# Patient Record
Sex: FEMALE | Race: BLACK OR AFRICAN AMERICAN | Employment: FULL TIME | URBAN - METROPOLITAN AREA
[De-identification: names, ages, dates, MRNs, and addresses within clinical notes are randomized per-mention and may not be internally consistent; named-entity substitution may affect disease eponyms.]

---

## 2017-03-23 ENCOUNTER — OFFICE VISIT (OUTPATIENT)
Dept: ORTHOPEDIC SURGERY | Age: 52
End: 2017-03-23

## 2017-03-23 VITALS
HEIGHT: 61 IN | DIASTOLIC BLOOD PRESSURE: 71 MMHG | OXYGEN SATURATION: 99 % | HEART RATE: 72 BPM | BODY MASS INDEX: 35.12 KG/M2 | SYSTOLIC BLOOD PRESSURE: 146 MMHG | WEIGHT: 186 LBS | RESPIRATION RATE: 18 BRPM

## 2017-03-23 DIAGNOSIS — M54.12 RIGHT CERVICAL RADICULOPATHY: Primary | ICD-10-CM

## 2017-03-23 DIAGNOSIS — M54.40 BACK PAIN OF LUMBAR REGION WITH SCIATICA: ICD-10-CM

## 2017-03-23 DIAGNOSIS — M48.02 CERVICAL STENOSIS OF SPINAL CANAL: ICD-10-CM

## 2017-03-23 RX ORDER — BUPRENORPHINE 5 UG/H
1 PATCH TRANSDERMAL
Qty: 4 PATCH | Refills: 1 | Status: SHIPPED | OUTPATIENT
Start: 2017-03-23 | End: 2018-09-11 | Stop reason: ALTCHOICE

## 2017-03-23 RX ORDER — ONDANSETRON 4 MG/1
4 TABLET, ORALLY DISINTEGRATING ORAL
COMMUNITY
Start: 2015-02-17 | End: 2017-03-23

## 2017-03-23 RX ORDER — GABAPENTIN 300 MG/1
CAPSULE ORAL
Qty: 90 CAP | Refills: 1 | Status: SHIPPED | OUTPATIENT
Start: 2017-03-23 | End: 2018-09-11 | Stop reason: ALTCHOICE

## 2017-03-23 NOTE — LETTER
3/23/2017 2:20 PM 
 
Ms. Italia Dior 
[address] [facility] [address] To Whom It May Concern: 
 
Italia Dior is currently under the care of Khloe N Prabhu Jamison. She is to remain out of work until her next office visit. If there are questions or concerns please have the patient contact our office. Sincerely, Porfirio Jamison MD

## 2017-03-23 NOTE — MR AVS SNAPSHOT
Visit Information Date & Time Provider Department Dept. Phone Encounter #  
 3/23/2017  1:15 PM Porfirio Jamison  [facility], [address] [facility] [phone] [id number] Follow-up Instructions Return in about 1 month (around 4/23/2017) for med f/u, adjustment. Your Appointments 4/27/2017  3:20 PM  
Follow Up with Porfirio Jamison MD  
[facility] Appt Note: 1 month nack fu $30  
 [address] [facility] [address]  
[phone]  
  
   
 [address] [facility] [address] Upcoming Health Maintenance Date Due DTaP/Tdap/Td series (1 - Tdap) 6/10/1987 PAP AKA CERVICAL CYTOLOGY 6/10/1987 BREAST CANCER SCRN MAMMOGRAM 6/10/2016 FOBT Q 1 YEAR AGE 50-75 6/10/2016 INFLUENZA AGE 9 TO ADULT 8/1/2016 Allergies as of 3/23/2017  Review Complete On: 3/23/2017 By: Porfirio Jamison MD  
  
 Severity Noted Reaction Type Reactions Pcn [Penicillins] High 01/13/2016    Anaphylaxis Codeine  01/13/2016    Nausea and Vomiting Duragesic [Fentanyl]  01/13/2016    Other (comments)  
 dizziness Current Immunizations  Never Reviewed No immunizations on file. Not reviewed this visit You Were Diagnosed With   
  
 Codes Comments Right cervical radiculopathy    -  Primary ICD-10-CM: M54.12 
ICD-9-CM: 723.4 Cervical stenosis of spinal canal     ICD-10-CM: M48.02 
ICD-9-CM: 723.0 Back pain of lumbar region with sciatica     ICD-10-CM: M54.40 ICD-9-CM: 724.2, 724.3 Vitals BP Pulse Resp Height(growth percentile) Weight(growth percentile) SpO2  
 146/71 72 18 5' 1\" (1.549 m) 186 lb (84.4 kg) 99% BMI Smoking Status 35.14 kg/m2 Never Smoker BMI and BSA Data Body Mass Index Body Surface Area  
 35.14 kg/m 2 1.91 m 2 Preferred Pharmacy Pharmacy Name Phone [id number] - [facility] [address] [place] [address] [phone] Your Updated Medication List  
  
   
This list is accurate as of: 3/23/17  2:27 PM.  Always use your most recent med list.  
  
  
  
  
 buprenorphine 5 mcg/hour patch Commonly known as:  BUTRANS  
1 Patch by TransDERmal route every seven (7) days. Max Daily Amount: 1 Patch.  
  
 gabapentin 300 mg capsule Commonly known as:  NEURONTIN  
1 tab PO QHS x 3 nights, then increase to 1 tab PO BID x 3 days, then increase to 1 tab PO TID Prescriptions Printed Refills  
 buprenorphine (BUTRANS) 5 mcg/hour patch 1 Si Patch by TransDERmal route every seven (7) days. Max Daily Amount: 1 Patch. Class: Print Route: TransDERmal  
  
Prescriptions Sent to Pharmacy Refills  
 gabapentin (NEURONTIN) 300 mg capsule 1 Si tab PO QHS x 3 nights, then increase to 1 tab PO BID x 3 days, then increase to 1 tab PO TID Class: Normal  
 Pharmacy: [id number] [address] #: [phone] Follow-up Instructions Return in about 1 month (around 2017) for med f/u, adjustment. Patient Instructions Pinched Nerve in the Neck: Care Instructions Your Care Instructions A pinched nerve in the neck happens when a vertebra or disc in the upper part of your spine is damaged. This damage can happen because of an injury. Or it can just happen with age. The changes caused by the damage may put pressure on a nearby nerve root, pinching it. This causes symptoms such as sharp pain in your neck, shoulder, arm, or back. You may also have tingling or numbness. Sometimes it makes your arm weaker. The symptoms are usually worse when you turn your head or strain your neck. For many people, the symptoms get better over time and finally go away. Early treatment usually includes medicines for pain and swelling. Sometimes physical therapy and special exercises may help. Follow-up care is a key part of your treatment and safety. Be sure to make and go to all appointments, and call your doctor if you are having problems. It's also a good idea to know your test results and keep a list of the medicines you take. How can you care for yourself at home? · Be safe with medicines. Read and follow all instructions on the label. ¨ If the doctor gave you a prescription medicine for pain, take it as prescribed. ¨ If you are not taking a prescription pain medicine, ask your doctor if you can take an over-the-counter medicine. · Try using a heating pad on a low or medium setting for 15 to 20 minutes every 2 or 3 hours. Try a warm shower in place of one session with the heating pad. You can also buy single-use heat wraps that last up to 8 hours. · You can also try an ice pack for 10 to 15 minutes every 2 to 3 hours. There isn't strong evidence that either heat or ice will help. But you can try them to see if they help you. · Don't spend too long in one position. Take short breaks to move around and change positions. · Wear a seat belt and shoulder harness when you are in a car. · Sleep with a pillow under your head and neck that keeps your neck straight. · If you were given a neck brace (cervical collar) to limit neck motion, wear it as instructed for as many days as your doctor tells you to. Do not wear it longer than you were told to. Wearing a brace for too long can lead to neck stiffness and can weaken the neck muscles. · Follow your doctor's instructions for gentle neck-stretching exercises. · Do not smoke. Smoking can slow healing of your discs. If you need help quitting, talk to your doctor about stop-smoking programs and medicines. These can increase your chances of quitting for good. · Avoid strenuous work or exercise until your doctor says it is okay. When should you call for help? Call 911 anytime you think you may need emergency care. For example, call if: 
· You are unable to move an arm or a leg at all. Call your doctor now or seek immediate medical care if: 
· You have new or worse symptoms in your arms, legs, chest, belly, or buttocks. Symptoms may include: ¨ Numbness or tingling. ¨ Weakness. ¨ Pain. · You lose bladder or bowel control. Watch closely for changes in your health, and be sure to contact your doctor if: 
· You are not getting better as expected. Where can you learn more? Go to http://gerry-louis.info/. Enter K777 in the search box to learn more about \"Pinched Nerve in the Neck: Care Instructions. \" Current as of: May 23, 2016 Content Version: 11.1 © 8005-0385 [employer]. Care instructions adapted under license by [employer] (which disclaims liability or warranty for this information). If you have questions about a medical condition or this instruction, always ask your healthcare professional. [place] [address] any warranty or liability for your use of this information. Cervical Discectomy: Before Your Surgery What is cervical discectomy? A cervical discectomy is surgery to take out damaged tissue from the discs in the neck area of the spine. You might also have bone growths (spurs) pressing on the nerves. Both of these conditions can cause pain in your back. The surgery takes pressure off the nerves. The doctor will take out tissue through a small cut in your neck. This cut is called an incision. It may be on the front of your neck, or it may be along your spine on the back of your neck. If the incision is at the front of your neck, your doctor will put in a small piece of bone between the vertebrae. Small plates and screws may be used to keep the bones in place. If the incision is at the back of your neck, the extra piece of bone often isn't needed. You will likely stay in the hospital 1 or 2 days. It may take up to 8 weeks to get back to your usual activities. Follow-up care is a key part of your treatment and safety. Be sure to make and go to all appointments, and call your doctor if you are having problems. It's also a good idea to know your test results and keep a list of the medicines you take. What happens before surgery? Surgery can be stressful. This information will help you understand what you can expect. And it will help you safely prepare for surgery. Preparing for surgery · Understand exactly what surgery is planned, along with the risks, benefits, and other options. · Tell your doctors ALL the medicines, vitamins, supplements, and herbal remedies you take. Some of these can increase the risk of bleeding or interact with anesthesia. · If you take blood thinners, such as warfarin (Coumadin), clopidogrel (Plavix), or aspirin, be sure to talk to your doctor. He or she will tell you if you should stop taking these medicines before your surgery. Make sure that you understand exactly what your doctor wants you to do. · Your doctor will tell you which medicines to take or stop before your surgery. You may need to stop taking certain medicines a week or more before surgery. So talk to your doctor as soon as you can. · If you have an advance directive, let your doctor know. It may include a living will and a durable power of  for health care. Bring a copy to the hospital. If you don't have one, you may want to prepare one. It lets your doctor and loved ones know your health care wishes. Doctors advise that everyone prepare these papers before any type of surgery or procedure. What happens on the day of surgery? · Follow the instructions exactly about when to stop eating and drinking. If you don't, your surgery may be canceled. If your doctor has told you to take your medicines on the day of surgery, take them with only a sip of water. · Take a bath or shower before you come in for your surgery. Do not apply lotions, perfumes, deodorants, or nail polish. · Do not shave the surgical site yourself. · Take off all jewelry and piercings. And take out contact lenses, if you wear them. At the hospital or surgery center · Bring a picture ID. · The area for surgery is often marked to make sure there are no errors. · You will be kept comfortable and safe by your anesthesia provider. You will be asleep during the surgery. · The surgery usually takes about 1 to 1½ hours. Going home · Be sure you have someone to drive you home. Anesthesia and pain medicine make it unsafe for you to drive. · You will be given more specific instructions about recovering from your surgery. They will cover things like diet, wound care, follow-up care, driving, and getting back to your normal routine. When should you call your doctor? · You have questions or concerns. · You don't understand how to prepare for your surgery. · You become ill before the surgery (such as fever, flu, or a cold). · You need to reschedule or have changed your mind about having the surgery. Where can you learn more? Go to http://gerry-louis.info/. Enter J462 in the search box to learn more about \"Cervical Discectomy: Before Your Surgery. \" Current as of: May 23, 2016 Content Version: 11.1 © 3513-0398 [employer]. Care instructions adapted under license by [employer] (which disclaims liability or warranty for this information). If you have questions about a medical condition or this instruction, always ask your healthcare professional. [address] any warranty or liability for your use of this information. Introducing [facility] & HEALTH SERVICES! Desirae Greenwood introduces Vaughn Burton patient portal. Now you can access parts of your medical record, email your doctor's office, and request medication refills online. 1. In your internet browser, go to [email or web address] 2. Click on the First Time User? Click Here link in the Sign In box. You will see the New Member Sign Up page. 3. Enter your [facility] Access Code exactly as it appears below. You will not need to use this code after youve completed the sign-up process. If you do not sign up before the expiration date, you must request a new code. · [facility] Access Code: [email or web address] Expires: 6/21/2017  2:27 PM 
 
4. Enter the last four digits of your Social Security Number (xxxx) and Date of Birth (mm/dd/yyyy) as indicated and click Submit. You will be taken to the next sign-up page. 5. Create a [employer] ID. This will be your [facility] login ID and cannot be changed, so think of one that is secure and easy to remember. 6. Create a [facility] password. You can change your password at any time. 7. Enter your Password Reset Question and Answer. This can be used at a later time if you forget your password. 8. Enter your e-mail address. You will receive e-mail notification when new information is available in [address] 9. Click Sign Up. You can now view and download portions of your medical record. 10. Click the Download Summary menu link to download a portable copy of your medical information. If you have questions, please visit the Frequently Asked Questions section of the [employer] [facility] website. Remember, [facility] is NOT to be used for urgent needs. For medical emergencies, dial 911. Now available from your iPhone and Android! Please provide this summary of care documentation to your next provider. Your primary care clinician is listed as PROVIDER UNKNOWN. If you have any questions after today's visit, please call [phone].

## 2017-03-23 NOTE — LETTER
3/23/2017 2:14 PM 
 
Ms. Hazel Neff 
[address] [address] [address] [address] To Whom It May Concern: 
 
Hazel Neff is currently under the care of [facility]. She is to remain out of work for the next three months. If there are questions or concerns please have the patient contact our office. Sincerely, Estrellita Brewster MD

## 2017-03-23 NOTE — PROGRESS NOTES
[facility].  [address]  [place], [address] [place] [address] Street  Phone: [phone]  Fax: [phone]        Josr Oakley  : 1965  PCP: PROVIDER UNKNOWN    PROGRESS NOTE      ASSESSMENT AND PLAN    Mary Donnelly was seen today for neck pain. Diagnoses and all orders for this visit:    Right cervical radiculopathy w/weakness  -     buprenorphine (BUTRANS) 5 mcg/hour patch; 1 Patch by TransDERmal route every seven (7) days. Max Daily Amount: 1 Patch. Cervical stenosis of spinal canal  -     buprenorphine (BUTRANS) 5 mcg/hour patch; 1 Patch by TransDERmal route every seven (7) days. Max Daily Amount: 1 Patch. Back pain of lumbar region with sciatica  -     buprenorphine (BUTRANS) 5 mcg/hour patch; 1 Patch by TransDERmal route every seven (7) days. Max Daily Amount: 1 Patch. Other orders  -     gabapentin (NEURONTIN) 300 mg capsule; 1 tab PO QHS x 3 nights, then increase to 1 tab PO BID x 3 days, then increase to 1 tab PO TID    1. Discussed indications for surgery, she has weakness. Understands possibility of incomplete healing with delayed decompression  2. Given work note to be out of work until her next office visit. 3. Continue Butrans patch. 4. Trail of Gabapentin. 5. Given surgical information for cervical discectomy. Follow-up Disposition:  Return in about 1 month (around 2017) for med f/u, adjustment. Risks and benefits of ongoing opiate therapy have been reviewed with the patient.  is not reviewed today due to technical difficulties. UDS results have been consistent. No pain behaviors. Pt has a good risk to benefit ratio which allows the pt to function in a home environment without side effects. HISTORY OF PRESENT ILLNESS  Violet Ryan is a 49 y.o. female. RHD. Pt presents to the office for a f/u visit for neck pain. UDS consistent with time of specimen.       She states that since her last office visit her pain has been increased. She reports that she picked something up at work and lost all feeling and use of her RUE in January 2017. She did not have surgery or have anything done about it. Pt states that she has good and bad days with her pain. Pt notes that she does not believe that she can do her job with her current condition. Pt notes that she did not want to come to this appointment today but she wishes to go on short term disability. Pt notes that she is able to move her RUE a bit as her and her  have been trying to increase her strength. Pt notes that for a month, she had muscle spasms in her neck that caused her to be unable to get out of bed. She was out of work for a month due to her increased pain and weakness. She notes that the pain in her RUE is a dull ache today. Continues to have weakness. Her last MRI was in 2012 and pt notes that she is still paying for it. Pt continues to have pain and paresthesia in her LLE. Her pain will interrupt her sleep at night. She has pain in her shoulder. She does not wish to consider surgery at this time. She uses Butrans patches with benefit but has run out since January/February. Pt denies any dizziness, confusion, uncontrolled constipation, and cravings due to controlled substances. She has not had great benefit from Prednisone in the past. Denies persistent fevers, chills, weight changes, neurogenic bowel or bladder symptoms. Pt denies recent ED visits or hospitalizations. Pt works at [employer] and has to lift 50 lbs boxes.      Pain Assessment  3/23/2017   Location of Pain Neck   Location Modifiers -   Severity of Pain 6   Quality of Pain Aching   Quality of Pain Comment -   Duration of Pain -   Frequency of Pain Constant   Aggravating Factors Bending;Stretching;Exercise;Standing;Walking   Aggravating Factors Comment -   Limiting Behavior -   Relieving Factors Rest   Result of Injury -           PAST MEDICAL HISTORY   Past Medical History:   Diagnosis Date    Arthritis  Diabetes ([facility] [address]     GERD (gastroesophageal reflux disease)     Migraines        Past Surgical History:   Procedure Laterality Date    HX TONSILLECTOMY      HX TUBAL LIGATION     . MEDICATIONS    Current Outpatient Prescriptions   Medication Sig Dispense Refill    buprenorphine (BUTRANS) 5 mcg/hour patch 1 Patch by TransDERmal route every seven (7) days. Max Daily Amount: 1 Patch. 4 Patch 0    ondansetron (ZOFRAN ODT) 4 mg disintegrating tablet 4 mg.  topiramate (TOPAMAX) 100 mg tablet 100 mg.      oxyCODONE-acetaminophen (PERCOCET) 5-325 mg per tablet Take 1 Tab by Mouth Every 4 Hours As Needed for Pain. Do not exceed 4000 mg of acetaminophen per day.  metFORMIN (GLUCOPHAGE) 500 mg tablet 500 mg.      meloxicam (MOBIC) 15 mg tablet Take  by Mouth Take As Needed.  fluticasone (FLONASE) 50 mcg/actuation nasal spray [place]  in Nose Every Night at Bedtime.  esomeprazole (NEXIUM) 40 mg capsule 40 mg.      ondansetron hcl (ZOFRAN, AS HYDROCHLORIDE,) 4 mg tablet Take 4 mg by mouth every eight (8) hours as needed for Nausea. ALLERGIES  Allergies   Allergen Reactions    Pcn [Penicillins] Anaphylaxis    Codeine Nausea and Vomiting    Duragesic [Fentanyl] Other (comments)     dizziness          SOCIAL HISTORY    Social History     Social History    Marital status:      Spouse name: N/A    Number of children: N/A    Years of education: N/A     Occupational History    Not on file.      Social History Main Topics    Smoking status: Never Smoker    Smokeless tobacco: Not on file    Alcohol use No    Drug use: Not on file    Sexual activity: Not on file     Other Topics Concern    Not on file     Social History Narrative       FAMILY HISTORY  Family History   Problem Relation Age of Onset   Velta Ganser Arthritis-rheumatoid Mother     Heart Disease Mother     Hypertension Mother        REVIEW OF SYSTEMS  Review of Systems   Constitutional: Negative for chills, fever and weight loss.   Respiratory: Negative for shortness of breath. Cardiovascular: Negative for chest pain. Gastrointestinal: Negative for constipation. Negative for fecal incontinence   Genitourinary: Negative for dysuria. Negative for urinary incontinence   Musculoskeletal:        Per HPI   Skin: Negative for rash. Neurological: Positive for tingling and focal weakness. Negative for dizziness, tremors and headaches. Endo/Heme/Allergies: Does not bruise/bleed easily. Psychiatric/Behavioral: The patient does not have insomnia. PHYSICAL EXAMINATION  Visit Vitals    /71    Pulse 72    Resp 18    Ht 5' 1\" (1.549 m)    Wt 186 lb (84.4 kg)    SpO2 99%    BMI 35.14 kg/m2         Accompanied by self. Constitutional:  Well developed, well nourished, in no acute distress. Psychiatric: Affect and mood are appropriate. Integumentary: No rashes or abrasions noted on exposed areas. Cardiovascular/Peripheral Vascular: Intact l pulses. No peripheral edema is noted. Lymphatic:  No evidence of lymphedema. No cervical lymphadenopathy. SPINE/MUSCULOSKELETAL EXAM    Cervical spine:  Neck is midline. Normal muscle tone. No focal atrophy is noted. Limited ROM in all planes. RT shoulder ROM actively. passively improved range. Negative Spurling's sign. Positive Tinel's sign right wrist and elbow. Negative Veloz's sign. Sensation grossly intact to light touch. Lumbar spine:  No rash, ecchymosis, or gross obliquity. No fasciculations. No focal atrophy is noted. No tenderness to palpation at the sciatic notch. SI joints non-tender. Trochanters non tender. Sensation grossly intact to light touch.         MOTOR:      Biceps  Triceps Deltoids Wrist Ext Wrist Flex Hand Intrin   Right 4/5 +4/5 +4/5 4/5 +4/5 +3/5   Left +4/5 +4/5 +4/5 +4/5 +4/5 +4/5        Hip Flex Quads Hamstrings Ankle DF EHL Ankle PF   Right +4/5 +4/5 +4/5 +4/5 +4/5 +4/5   Left +4/5 +4/5 +4/5 +4/5 +4/5 +4/5 DTRs are 1+ biceps, triceps, brachioradialis, patella, and Achilles. No difficulty with tandem gait. Ambulation without assistive device. FWB. Written by Artie Montalvo, as dictated by Ezequiel Kign MD.    Ms. Dio Verde may have a reminder for a \"due or due soon\" health maintenance. I have asked that she contact her primary care provider for follow-up on this health maintenance.

## 2017-03-23 NOTE — PATIENT INSTRUCTIONS
Pinched Nerve in the Neck: Care Instructions  Your Care Instructions  A pinched nerve in the neck happens when a vertebra or disc in the upper part of your spine is damaged. This damage can happen because of an injury. Or it can just happen with age. The changes caused by the damage may put pressure on a nearby nerve root, pinching it. This causes symptoms such as sharp pain in your neck, shoulder, arm, or back. You may also have tingling or numbness. Sometimes it makes your arm weaker. The symptoms are usually worse when you turn your head or strain your neck. For many people, the symptoms get better over time and finally go away. Early treatment usually includes medicines for pain and swelling. Sometimes physical therapy and special exercises may help. Follow-up care is a key part of your treatment and safety. Be sure to make and go to all appointments, and call your doctor if you are having problems. It's also a good idea to know your test results and keep a list of the medicines you take. How can you care for yourself at home? · Be safe with medicines. Read and follow all instructions on the label. ¨ If the doctor gave you a prescription medicine for pain, take it as prescribed. ¨ If you are not taking a prescription pain medicine, ask your doctor if you can take an over-the-counter medicine. · Try using a heating pad on a low or medium setting for 15 to 20 minutes every 2 or 3 hours. Try a warm shower in place of one session with the heating pad. You can also buy single-use heat wraps that last up to 8 hours. · You can also try an ice pack for 10 to 15 minutes every 2 to 3 hours. There isn't strong evidence that either heat or ice will help. But you can try them to see if they help you. · Don't spend too long in one position. Take short breaks to move around and change positions. · Wear a seat belt and shoulder harness when you are in a car.   · Sleep with a pillow under your head and neck that keeps your neck straight. · If you were given a neck brace (cervical collar) to limit neck motion, wear it as instructed for as many days as your doctor tells you to. Do not wear it longer than you were told to. Wearing a brace for too long can lead to neck stiffness and can weaken the neck muscles. · Follow your doctor's instructions for gentle neck-stretching exercises. · Do not smoke. Smoking can slow healing of your discs. If you need help quitting, talk to your doctor about stop-smoking programs and medicines. These can increase your chances of quitting for good. · Avoid strenuous work or exercise until your doctor says it is okay. When should you call for help? Call 911 anytime you think you may need emergency care. For example, call if:  · You are unable to move an arm or a leg at all. Call your doctor now or seek immediate medical care if:  · You have new or worse symptoms in your arms, legs, chest, belly, or buttocks. Symptoms may include:  ¨ Numbness or tingling. ¨ Weakness. ¨ Pain. · You lose bladder or bowel control. Watch closely for changes in your health, and be sure to contact your doctor if:  · You are not getting better as expected. Where can you learn more? Go to http://gerry-louis.info/. Enter J311 in the search box to learn more about \"Pinched Nerve in the Neck: Care Instructions. \"  Current as of: May 23, 2016  Content Version: 11.1  © 4512-8438 [employer]. Care instructions adapted under license by [employer] (which disclaims liability or warranty for this information). If you have questions about a medical condition or this instruction, always ask your healthcare professional. [place] [address] any warranty or liability for your use of this information. Cervical Discectomy: Before Your Surgery  What is cervical discectomy?     A cervical discectomy is surgery to take out damaged tissue from the discs in the neck area of the spine. You might also have bone growths (spurs) pressing on the nerves. Both of these conditions can cause pain in your back. The surgery takes pressure off the nerves. The doctor will take out tissue through a small cut in your neck. This cut is called an incision. It may be on the front of your neck, or it may be along your spine on the back of your neck. If the incision is at the front of your neck, your doctor will put in a small piece of bone between the vertebrae. Small plates and screws may be used to keep the bones in place. If the incision is at the back of your neck, the extra piece of bone often isn't needed. You will likely stay in the hospital 1 or 2 days. It may take up to 8 weeks to get back to your usual activities. Follow-up care is a key part of your treatment and safety. Be sure to make and go to all appointments, and call your doctor if you are having problems. It's also a good idea to know your test results and keep a list of the medicines you take. What happens before surgery? Surgery can be stressful. This information will help you understand what you can expect. And it will help you safely prepare for surgery. Preparing for surgery  · Understand exactly what surgery is planned, along with the risks, benefits, and other options. · Tell your doctors ALL the medicines, vitamins, supplements, and herbal remedies you take. Some of these can increase the risk of bleeding or interact with anesthesia. · If you take blood thinners, such as warfarin (Coumadin), clopidogrel (Plavix), or aspirin, be sure to talk to your doctor. He or she will tell you if you should stop taking these medicines before your surgery. Make sure that you understand exactly what your doctor wants you to do. · Your doctor will tell you which medicines to take or stop before your surgery. You may need to stop taking certain medicines a week or more before surgery.  So talk to your doctor as soon as you can.  · If you have an advance directive, let your doctor know. It may include a living will and a durable power of  for health care. Bring a copy to the hospital. If you don't have one, you may want to prepare one. It lets your doctor and loved ones know your health care wishes. Doctors advise that everyone prepare these papers before any type of surgery or procedure. What happens on the day of surgery? · Follow the instructions exactly about when to stop eating and drinking. If you don't, your surgery may be canceled. If your doctor has told you to take your medicines on the day of surgery, take them with only a sip of water. · Take a bath or shower before you come in for your surgery. Do not apply lotions, perfumes, deodorants, or nail polish. · Do not shave the surgical site yourself. · Take off all jewelry and piercings. And take out contact lenses, if you wear them. At the hospital or surgery center  · Bring a picture ID. · The area for surgery is often marked to make sure there are no errors. · You will be kept comfortable and safe by your anesthesia provider. You will be asleep during the surgery. · The surgery usually takes about 1 to 1½ hours. Going home  · Be sure you have someone to drive you home. Anesthesia and pain medicine make it unsafe for you to drive. · You will be given more specific instructions about recovering from your surgery. They will cover things like diet, wound care, follow-up care, driving, and getting back to your normal routine. When should you call your doctor? · You have questions or concerns. · You don't understand how to prepare for your surgery. · You become ill before the surgery (such as fever, flu, or a cold). · You need to reschedule or have changed your mind about having the surgery. Where can you learn more? Go to http://gerry-louis.info/.   Enter M376 in the search box to learn more about \"Cervical Discectomy: Before Your Surgery. \"  Current as of: May 23, 2016  Content Version: 11.1  © 5536-5222 [employer], [facility]. Care instructions adapted under license by [employer] (which disclaims liability or warranty for this information). If you have questions about a medical condition or this instruction, always ask your healthcare professional. [place] [address] any warranty or liability for your use of this information.

## 2018-09-11 ENCOUNTER — DOCUMENTATION ONLY (OUTPATIENT)
Dept: ORTHOPEDIC SURGERY | Age: 53
End: 2018-09-11

## 2018-09-11 ENCOUNTER — OFFICE VISIT (OUTPATIENT)
Dept: ORTHOPEDIC SURGERY | Age: 53
End: 2018-09-11

## 2018-09-11 VITALS
BODY MASS INDEX: 36.93 KG/M2 | DIASTOLIC BLOOD PRESSURE: 68 MMHG | WEIGHT: 195.6 LBS | TEMPERATURE: 98.1 F | OXYGEN SATURATION: 98 % | SYSTOLIC BLOOD PRESSURE: 160 MMHG | RESPIRATION RATE: 19 BRPM | HEIGHT: 61 IN | HEART RATE: 88 BPM

## 2018-09-11 DIAGNOSIS — M48.02 CERVICAL STENOSIS OF SPINAL CANAL: Primary | ICD-10-CM

## 2018-09-11 DIAGNOSIS — M54.12 CERVICAL RADICULOPATHY: ICD-10-CM

## 2018-09-11 RX ORDER — KETOROLAC TROMETHAMINE 15 MG/ML
60 INJECTION, SOLUTION INTRAMUSCULAR; INTRAVENOUS ONCE
Qty: 1 VIAL | Refills: 0
Start: 2018-09-11 | End: 2018-09-11

## 2018-09-11 RX ORDER — MELOXICAM 15 MG/1
15 TABLET ORAL DAILY
Qty: 30 TAB | Refills: 2 | Status: SHIPPED | OUTPATIENT
Start: 2018-09-11 | End: 2022-02-24 | Stop reason: SDUPTHER

## 2018-09-11 RX ORDER — AMITRIPTYLINE HYDROCHLORIDE 10 MG/1
10 TABLET, FILM COATED ORAL
Qty: 30 TAB | Refills: 1 | Status: SHIPPED | OUTPATIENT
Start: 2018-09-11 | End: 2022-01-06

## 2018-09-11 RX ORDER — AMITRIPTYLINE HYDROCHLORIDE 25 MG/1
25 TABLET, FILM COATED ORAL
Qty: 30 TAB | Refills: 2 | Status: SHIPPED | OUTPATIENT
Start: 2018-09-11 | End: 2018-09-11 | Stop reason: SDUPTHER

## 2018-09-11 NOTE — MR AVS SNAPSHOT
[address] [facility] [address] [facility] [address] [facility] [address] 
[phone] Patient: Dora Saleem MRN: [id number] :1965 Visit Information Date & Time Provider Department Dept. Phone Encounter #  
 2018  8:50 AM London Tang NP [facility] [phone] [id number] Follow-up Instructions Return in about 4 weeks (around 10/9/2018) for butteyr . Upcoming Health Maintenance Date Due DTaP/Tdap/Td series (1 - Tdap) 6/10/1987 PAP AKA CERVICAL CYTOLOGY 6/10/1987 BREAST CANCER SCRN MAMMOGRAM 6/10/2016 FOBT Q 1 YEAR AGE 50-75 6/10/2016 Influenza Age 5 to Adult 2018 Allergies as of 2018  Review Complete On: 2018 By: Jena Munoz Severity Noted Reaction Type Reactions Pcn [Penicillins] High 2016    Anaphylaxis Codeine  2016    Nausea and Vomiting Duragesic [Fentanyl]  2016    Other (comments)  
 dizziness Current Immunizations  Never Reviewed No immunizations on file. Not reviewed this visit You Were Diagnosed With   
  
 Codes Comments Cervical stenosis of spinal canal    -  Primary ICD-10-CM: M48.02 
ICD-9-CM: 723.0 Cervical radiculopathy     ICD-10-CM: M54.12 
ICD-9-CM: 723.4 Vitals BP Pulse Temp Resp Height(growth percentile) Weight(growth percentile) 160/68 88 98.1 °F (36.7 °C) 19 5' 1\" (1.549 m) 195 lb 9.6 oz (88.7 kg) SpO2 BMI Smoking Status 98% 36.96 kg/m2 Never Smoker BMI and BSA Data Body Mass Index Body Surface Area  
 36.96 kg/m 2 1.95 m 2 Preferred Pharmacy Pharmacy Name Phone [facility] [address] [phone] Your Updated Medication List  
  
   
This list is accurate as of 18  9:29 AM.  Always use your most recent med list.  
  
  
  
  
 amitriptyline 25 mg tablet Commonly known as:  ELAVIL  
 Take 1 Tab by mouth nightly.  
  
 ketorolac 15 mg/mL Soln injection Commonly known as:  TORADOL  
4 mL by IntraMUSCular route once for 1 dose. meloxicam 15 mg tablet Commonly known as:  MOBIC Take 1 Tab by mouth daily. Prescriptions Printed Refills  
 amitriptyline (ELAVIL) 25 mg tablet 2 Sig: Take 1 Tab by mouth nightly. Class: Print Route: Oral  
  
Prescriptions Sent to Pharmacy Refills  
 meloxicam (MOBIC) 15 mg tablet 2 Sig: Take 1 Tab by mouth daily. Class: Normal  
 [facility]acy: [id number] - [address], [address] [place] [address] #: [phone] Route: Oral  
  
We Performed the Following KETOROLAC TROMETHAMINE INJ [ [facility]] MD THER/PROPH/DIAG INJECTION, SUBCUT/IM [id number] CPT(R)] Follow-up Instructions Return in about 4 weeks (around 10/9/2018) for butteyr . Patient Instructions Neck Arthritis: Exercises Your Care Instructions Here are some examples of typical rehabilitation exercises for your condition. Start each exercise slowly. Ease off the exercise if you start to have pain. Your doctor or physical therapist will tell you when you can start these exercises and which ones will work best for you. How to do the exercises Neck stretches to the side 1. This stretch works best if you keep your shoulder down as you lean away from it. To help you remember to do this, start by relaxing your shoulders and lightly holding on to your thighs or your chair. 2. Tilt your head toward your shoulder and hold for 15 to 30 seconds. Let the weight of your head stretch your muscles. 3. Repeat 2 to 4 times toward each shoulder. Chin tuck 1. Lie on the floor with a rolled-up towel under your neck. Your head should be touching the floor. 2. Slowly bring your chin toward your chest. 
3. Hold for a count of 6, and then relax for up to 10 seconds. 4. Repeat 8 to 12 times. Active cervical rotation 1. Sit in a firm chair, or stand up straight. 2. Keeping your chin level, turn your head to the right, and hold for 15 to 30 seconds. 3. Turn your head to the left and hold for 15 to 30 seconds. 4. Repeat 2 to 4 times to each side. Shoulder blade squeeze 1. While standing, squeeze your shoulder blades together. 2. Do not raise your shoulders up as you are squeezing. 3. Hold for 6 seconds. 4. Repeat 8 to 12 times. Shoulder rolls 1. Sit comfortably with your feet shoulder-width apart. You can also do this exercise standing up. 2. Roll your shoulders up, then back, and then down in a smooth, circular motion. 3. Repeat 2 to 4 times. Follow-up care is a key part of your treatment and safety. Be sure to make and go to all appointments, and call your doctor if you are having problems. It's also a good idea to know your test results and keep a list of the medicines you take. Where can you learn more? Go to http://gerry-louis.info/. Enter L446 in the search box to learn more about \"Neck Arthritis: Exercises. \" Current as of: November 29, 2017 Content Version: 11.7 © 8492-7323 [employer]. Care instructions adapted under license by [employer] (which disclaims liability or warranty for this information). If you have questions about a medical condition or this instruction, always ask your healthcare professional. [place] [address] any warranty or liability for your use of this information. Neck Spasm: Exercises Your Care Instructions Here are some examples of typical rehabilitation exercises for your condition. Start each exercise slowly. Ease off the exercise if you start to have pain. Your doctor or physical therapist will tell you when you can start these exercises and which ones will work best for you. How to do the exercises Levator scapula stretch 4. Sit in a firm chair, or stand up straight. 5. Gently tilt your head toward your left shoulder. 6. Turn your head to look down into your armpit, bending your head slightly forward. Let the weight of your head stretch your neck muscles. 7. Hold for 15 to 30 seconds. 8. Return to your starting position. 9. Follow the same instructions above, but tilt your head toward your right shoulder. 10. Repeat 2 to 4 times toward each shoulder. Upper trapezius stretch 5. Sit in a firm chair, or stand up straight. 6. This stretch works best if you keep your shoulder down as you lean away from it. To help you remember to do this, start by relaxing your shoulders and lightly holding on to your thighs or your chair. 7. Tilt your head toward your shoulder and hold for 15 to 30 seconds. Let the weight of your head stretch your muscles. 8. If you would like a little added stretch, place your arm behind your back. Use the arm opposite of the direction you are tilting your head. For example, if you are tilting your head to the left, place your right arm behind your back. 9. Repeat 2 to 4 times toward each shoulder. Neck rotation 5. Sit in a firm chair, or stand up straight. 6. Keeping your chin level, turn your head to the right, and hold for 15 to 30 seconds. 7. Turn your head to the left, and hold for 15 to 30 seconds. 8. Repeat 2 to 4 times to each side. Chin tuck 5. Lie on the floor with a rolled-up towel under your neck. Your head should be touching the floor. 6. Slowly bring your chin toward the front of your neck. 7. Hold for a count of 6, and then relax for up to 10 seconds. 8. Repeat 8 to 12 times. Forward neck flexion 4. Sit in a firm chair, or stand up straight. 5. Bend your head forward. 6. Hold for 15 to 30 seconds, then return to your starting position. 7. Repeat 2 to 4 times. Follow-up care is a key part of your treatment and safety.  Be sure to make and go to all appointments, and call your doctor if you are having problems. It's also a good idea to know your test results and keep a list of the medicines you take. Where can you learn more? Go to http://gerry-louis.info/. Enter P962 in the search box to learn more about \"Neck Spasm: Exercises. \" Current as of: November 29, 2017 Content Version: 11.7 © 0269-8563 [employer]. Care instructions adapted under license by [employer] (which disclaims liability or warranty for this information). If you have questions about a medical condition or this instruction, always ask your healthcare professional. [place] [address] any warranty or liability for your use of this information. Cervical Spinal Stenosis: Care Instructions Your Care Instructions Spinal stenosis is a narrowing of the canal that surrounds the spinal cord and nerve roots. Sometimes bone and other tissue grow into this canal and press on the nerves that branch out from the spinal cord. This can happen as a part of aging. When the narrowing happens in your neck, it's called cervical spinal stenosis. It often causes stiffness, pain, numbness, and weakness in the neck, shoulders, arms, hands, or legs. It can even cause problems with your balance, coordination, and bowel or bladder control. But some people have no symptoms. You may be able to get relief from the symptoms of spinal stenosis by taking pain medicine. Your doctor may suggest physical therapy and exercises to keep your spine strong and flexible. Some people try steroid shots to reduce swelling. If pain and numbness in your neck, arms, or legs are still so bad that you cannot do your normal activities, you may need surgery. Follow-up care is a key part of your treatment and safety. Be sure to make and go to all appointments, and call your doctor if you are having problems. It's also a good idea to know your test results and keep a list of the medicines you take. How can you care for yourself at home? · Ask your doctor if you can take an over-the-counter pain medicine, such as acetaminophen (Tylenol), ibuprofen (Advil, Motrin), or naproxen (Aleve). Be safe with medicines. Read and follow all instructions on the label. · Do not take two or more pain medicines at the same time unless the doctor told you to. Many pain medicines have acetaminophen, which is Tylenol. Too much acetaminophen (Tylenol) can be harmful. · Change positions often when you are standing or sitting. This may reduce pressure on the spinal cord and its nerves. · When you rest, use pillows or towel rolls to support your neck and head in a comfortable position. · Follow your doctor's instructions about activity. He or she may tell you not to do sports or activities that could injure your neck. · Stretch your neck and shoulders as your doctor or physical therapist recommends. If your doctor says it is okay to do them, these exercises may help: 
¨ Neck stretches to the side. Keep your shoulders relaxed and slowly tilt your head straight over toward one shoulder. Hold for 15 seconds. Let the weight of your head stretch your muscles. Then do the same toward the other shoulder. ¨ Neck rotations. Keep your chin level and slowly turn your head to one side. Hold for 15 seconds. Then do the same to the other side. ¨ Shoulder rolls. Roll your shoulders up, then back, and then down in a smooth, circular motion. Repeat several times. When should you call for help? Call 911 anytime you think you may need emergency care. For example, call if: 
  · You are unable to move an arm or a leg at all.  
[facility] your doctor now or seek immediate medical care if: 
  · You have new or worse symptoms in your arms, legs, belly, or buttocks. Symptoms may include: ¨ Numbness or tingling. ¨ Weakness.  
¨ Pain.  
  · You lose bladder or bowel control.  
 Watch closely for changes in your health, and be sure to contact your doctor if: 
  · You do not get better as expected. Where can you learn more? Go to http://gerry-louis.info/. Enter  in the search box to learn more about \"Cervical Spinal Stenosis: Care Instructions. \" Current as of: November 29, 2017 Content Version: 11.7 © 7777-0897 [employer]. Care instructions adapted under license by [employer] (which disclaims liability or warranty for this information). If you have questions about a medical condition or this instruction, always ask your healthcare professional. [address] any warranty or liability for your use of this information. Introducing [facility]! [employer] introduces [employer] patient portal. Now you can access parts of your medical record, email your doctor's office, and request medication refills online. 1. In your internet browser, go to [email or web address] 2. Click on the First Time User? Click Here link in the Sign In box. You will see the New Member Sign Up page. 3. Enter your [employer] Access Code exactly as it appears below. You will not need to use this code after youve completed the sign-up process. If you do not sign up before the expiration date, you must request a new code. · [employer] Access Code: [email or web address] Expires: 12/10/2018  9:29 AM 
 
4. Enter the last four digits of your Social Security Number (xxxx) and Date of Birth (mm/dd/yyyy) as indicated and click Submit. You will be taken to the next sign-up page. 5. Create a [employer] ID. This will be your [employer] login ID and cannot be changed, so think of one that is secure and easy to remember. 6. Create a [employer] password. You can change your password at any time. 7. Enter your Password Reset Question and Answer. This can be used at a later time if you forget your password. 8. Enter your e-mail address.  You will receive e-mail notification when new information is available in [employer]. 9. Click Sign Up. You can now view and download portions of your medical record. 10. Click the Download Summary menu link to download a portable copy of your medical information. If you have questions, please visit the Frequently Asked Questions section of the [employer] website. Remember, [employer] is NOT to be used for urgent needs. For medical emergencies, dial 911. Now available from your iPhone and Android! Please provide this summary of care documentation to your next provider. Your primary care clinician is listed as Nicole Mccallum. If you have any questions after today's visit, please call [phone].

## 2018-09-11 NOTE — PROGRESS NOTES
[address]  [address], [address]  [place]  Phone: [phone]  Fax: [phone]    Manohar March  : 1965  PCP: KULDIP Urbano    PROGRESS NOTE    HISTORY OF PRESENT ILLNESS:  Chief Complaint   Patient presents with    Neck Pain    Arm Pain     Bilateral     Follow-up     Aga Newman is a 47 y.o.  female with history of neck pain. Patient was last seen with Dr. Crystal Barr in 2017. Per that note, Pt notes that she does not believe that she can do her job with her current condition. Pt notes that she did not want to come to the appointment but she wishes to go on short term disability. She was having pain in her shoulder and LLE. She did not want to consider surgery. She was given butrans patches and gabapentin. She has had prednisone in the past. Last MRI in . Today, she is back today with right arm pain. She is starting to have some effect on the left arm as well. She states her right thumb is numb and she her forearm is numb. If she turns her neck a certain way she will get a \"asleep\" feeling in her arm and it will be sharp at times. She states she is off balance at times and does admit to dropping objects. This has flared over the past month. She states normally, it will calm down but this time it has not. She has taken Elavil and Mobic in the past with benefit. She does not recall taking the Gabapentin that was prescribed over a year ago. Denies bladder/bowel dysfunction, saddle paresthesia, weakness, gait disturbance, or other neurological deficit. Pt at this time desires to continue with current care/proceed with medication evaluation. Pt works at [employer] and has to lift 50 lbs boxes. ASSESSMENT  47 y.o. female with cervical pain. Diagnoses and all orders for this visit:    1.  Cervical stenosis of spinal canal  -     KETOROLAC TROMETHAMINE INJ  -     ketorolac (TORADOL) 15 mg/mL soln injection; 4 mL by IntraMUSCular route once for 1 dose. -     THER/PROPH/DIAG INJECTION, SUBCUT/IM  -     amitriptyline (ELAVIL) 25 mg tablet; Take 1 Tab by mouth nightly. -     meloxicam (MOBIC) 15 mg tablet; Take 1 Tab by mouth daily. 2. Cervical radiculopathy  -     KETOROLAC TROMETHAMINE INJ  -     ketorolac (TORADOL) 15 mg/mL soln injection; 4 mL by IntraMUSCular route once for 1 dose. -     THER/PROPH/DIAG INJECTION, SUBCUT/IM  -     amitriptyline (ELAVIL) 25 mg tablet; Take 1 Tab by mouth nightly. -     meloxicam (MOBIC) 15 mg tablet; Take 1 Tab by mouth daily. IMPRESSION/PLAN    1) Pt was given information on cervical exercises. 2) Toradol 60 mg today. 3) Restart Elavil, this has helped in the past. Restart Mobic. Discussed this can place you at a risk for cardiovascular effects and GI. She will take PRN. 4) discussed MRI in detail. She is still paying off the 2012 MRI and would like to hold off ,however ,we did discuss her symptoms and that this is a recommended test. Will try HEP and elavil first.   5) Ms. Obdulia Jimenes has a reminder for a \"due or due soon\" health maintenance. I have asked that she contact her primary care provider, KULDIP Urbano, for follow-up on this health maintenance. 6) We have informed patient to notify us for immediate appointment if he has any worsening neurogical symptoms or if an emergency situation presents, then call 911  7) Pt will follow-up in 4-6 weeks. Risks and benefits of ongoing opiate therapy have been reviewed with the patient.  is appropriate. PAST MEDICAL HISTORY  Past Medical History:   Diagnosis Date    Arthritis     Diabetes ([facility] [address]     GERD (gastroesophageal reflux disease)     Migraines         MEDICATIONS  Current Outpatient Prescriptions   Medication Sig Dispense Refill    ketorolac (TORADOL) 15 mg/mL soln injection 4 mL by IntraMUSCular route once for 1 dose. 1 Vial 0    amitriptyline (ELAVIL) 25 mg tablet Take 1 Tab by mouth nightly.  30 Tab 2    meloxicam (MOBIC) 15 mg tablet Take 1 Tab by mouth daily. 30 Tab 2       ALLERGIES  Allergies   Allergen Reactions    Pcn [Penicillins] Anaphylaxis    Codeine Nausea and Vomiting    Duragesic [Fentanyl] Other (comments)     dizziness       SOCIAL HISTORY    Social History     Social History    Marital status:      Spouse name: N/A    Number of children: N/A    Years of education: N/A     Occupational History    Not on file. Social History Main Topics    Smoking status: Never Smoker    Smokeless tobacco: Never Used    Alcohol use No    Drug use: Not on file    Sexual activity: Not on file     Other Topics Concern    Not on file     Social History Narrative       SUBJECTIVE      Pain Scale: 8/10    Pain Assessment  3/23/2017   Location of Pain Neck   Location Modifiers -   Severity of Pain 6   Quality of Pain Aching   Quality of Pain Comment -   Duration of Pain -   Frequency of Pain Constant   Aggravating Factors Bending;Stretching;Exercise;Standing;Walking   Aggravating Factors Comment -   Limiting Behavior -   Relieving Factors Rest   Result of Injury -       Accompanied by self. REVIEW OF SYSTEMS  ROS    Constitutional: Negative for fever, chills, or weight change. Respiratory: Negative for cough or shortness of breath. Cardiovascular: Negative for chest pain or palpitations. Gastrointestinal: Negative for acid reflux, change in bowel habits, or constipation. Genitourinary: Negative for incontinence, dysuria and flank pain. Musculoskeletal: Positive for cervical pain. Skin: Negative for rash. Neurological: Negative for headaches, dizziness, or numbness. Endo/Heme/Allergies: Negative . Psychiatric/Behavioral: Negative.        PHYSICAL EXAMINATION  Visit Vitals    /68    Pulse 88    Temp 98.1 °F (36.7 °C)    Resp 19    Ht 5' 1\" (1.549 m)    Wt 195 lb 9.6 oz (88.7 kg)    SpO2 98%    BMI 36.96 kg/m2       Constitutional: Well developed,  well nourished,  awake, alert, and in no acute distress. Neurological:  Sensation to light touch is intact. Psychiatric: Affect and mood are appropriate. Integumentary: No rashes or abrasions noted on exposed areas,  warm, dry and intact. Cardiovascular/Peripheral Vascular:  No peripheral edema is noted. Lymphatic:  No evidence of lymphedema. No cervical lymphadenopathy. SPINE/MUSCULOSKELETAL EXAM    Cervical spine:  Neck is midline. Normal muscle tone. No focal atrophy is noted. Shoulder ROM intact. NTenderness to palpation to cervical pain. Negative Spurling's sign. Negative Tinel's sign. Negative Veloz's sign. MOTOR    Biceps  Triceps Deltoids Wrist Ext Wrist Flex Hand Intrin   Right -4/5 -4/5 -4/5 +4/5 +4/5 -4/5   Left +4/5 +4/5 +4/5 +4/5 +4/5 +4/5       normal gait and station, poor tandem. Ambulation without assistive device. full weight bearing, non-antalgic gait.     No Shape, NP

## 2018-09-11 NOTE — PROGRESS NOTES
-called patient and advised her to use a pill cutter and take half of the 25 mg elavil that she picked up. The patient stated that she already took a pill when she got home. She was advised to cut the pill in half if she felt any adverse affects and take half a pill for a week, then increase to a whole pill. If she does not feel any adverse affects, then she can continue with taking a whole pill QHS. The patient verbalized understanding.

## 2018-09-11 NOTE — PATIENT INSTRUCTIONS
Neck Arthritis: Exercises  Your Care Instructions  Here are some examples of typical rehabilitation exercises for your condition. Start each exercise slowly. Ease off the exercise if you start to have pain. Your doctor or physical therapist will tell you when you can start these exercises and which ones will work best for you. How to do the exercises  Neck stretches to the side    1. This stretch works best if you keep your shoulder down as you lean away from it. To help you remember to do this, start by relaxing your shoulders and lightly holding on to your thighs or your chair. 2. Tilt your head toward your shoulder and hold for 15 to 30 seconds. Let the weight of your head stretch your muscles. 3. Repeat 2 to 4 times toward each shoulder. Chin tuck    1. Lie on the floor with a rolled-up towel under your neck. Your head should be touching the floor. 2. Slowly bring your chin toward your chest.  3. Hold for a count of 6, and then relax for up to 10 seconds. 4. Repeat 8 to 12 times. Active cervical rotation    1. Sit in a firm chair, or stand up straight. 2. Keeping your chin level, turn your head to the right, and hold for 15 to 30 seconds. 3. Turn your head to the left and hold for 15 to 30 seconds. 4. Repeat 2 to 4 times to each side. Shoulder blade squeeze    1. While standing, squeeze your shoulder blades together. 2. Do not raise your shoulders up as you are squeezing. 3. Hold for 6 seconds. 4. Repeat 8 to 12 times. Shoulder rolls    1. Sit comfortably with your feet shoulder-width apart. You can also do this exercise standing up. 2. Roll your shoulders up, then back, and then down in a smooth, circular motion. 3. Repeat 2 to 4 times. Follow-up care is a key part of your treatment and safety. Be sure to make and go to all appointments, and call your doctor if you are having problems. It's also a good idea to know your test results and keep a list of the medicines you take.   Where can you learn more? Go to http://gerry-louis.info/. Enter C826 in the search box to learn more about \"Neck Arthritis: Exercises. \"  Current as of: November 29, 2017  Content Version: 11.7  © 5917-8163 [employer]. Care instructions adapted under license by [employer] (which disclaims liability or warranty for this information). If you have questions about a medical condition or this instruction, always ask your healthcare professional. [address] any warranty or liability for your use of this information. Neck Spasm: Exercises  Your Care Instructions  Here are some examples of typical rehabilitation exercises for your condition. Start each exercise slowly. Ease off the exercise if you start to have pain. Your doctor or physical therapist will tell you when you can start these exercises and which ones will work best for you. How to do the exercises  Levator scapula stretch    4. Sit in a firm chair, or stand up straight. 5. Gently tilt your head toward your left shoulder. 6. Turn your head to look down into your armpit, bending your head slightly forward. Let the weight of your head stretch your neck muscles. 7. Hold for 15 to 30 seconds. 8. Return to your starting position. 9. Follow the same instructions above, but tilt your head toward your right shoulder. 10. Repeat 2 to 4 times toward each shoulder. Upper trapezius stretch    5. Sit in a firm chair, or stand up straight. 6. This stretch works best if you keep your shoulder down as you lean away from it. To help you remember to do this, start by relaxing your shoulders and lightly holding on to your thighs or your chair. 7. Tilt your head toward your shoulder and hold for 15 to 30 seconds. Let the weight of your head stretch your muscles. 8. If you would like a little added stretch, place your arm behind your back. Use the arm opposite of the direction you are tilting your head. For example, if you are tilting your head to the left, place your right arm behind your back. 9. Repeat 2 to 4 times toward each shoulder. Neck rotation    5. Sit in a firm chair, or stand up straight. 6. Keeping your chin level, turn your head to the right, and hold for 15 to 30 seconds. 7. Turn your head to the left, and hold for 15 to 30 seconds. 8. Repeat 2 to 4 times to each side. Chin tuck    5. Lie on the floor with a rolled-up towel under your neck. Your head should be touching the floor. 6. Slowly bring your chin toward the front of your neck. 7. Hold for a count of 6, and then relax for up to 10 seconds. 8. Repeat 8 to 12 times. Forward neck flexion    4. Sit in a firm chair, or stand up straight. 5. Bend your head forward. 6. Hold for 15 to 30 seconds, then return to your starting position. 7. Repeat 2 to 4 times. Follow-up care is a key part of your treatment and safety. Be sure to make and go to all appointments, and call your doctor if you are having problems. It's also a good idea to know your test results and keep a list of the medicines you take. Where can you learn more? Go to http://gerry-louis.info/. Enter P962 in the search box to learn more about \"Neck Spasm: Exercises. \"  Current as of: November 29, 2017  Content Version: 11.7  © 4821-1543 [employer]. Care instructions adapted under license by [employer] (which disclaims liability or warranty for this information). If you have questions about a medical condition or this instruction, always ask your healthcare professional. [place] [address] any warranty or liability for your use of this information. Cervical Spinal Stenosis: Care Instructions  Your Care Instructions    Spinal stenosis is a narrowing of the canal that surrounds the spinal cord and nerve roots.  Sometimes bone and other tissue grow into this canal and press on the nerves that branch out from the spinal cord. This can happen as a part of aging. When the narrowing happens in your neck, it's called cervical spinal stenosis. It often causes stiffness, pain, numbness, and weakness in the neck, shoulders, arms, hands, or legs. It can even cause problems with your balance, coordination, and bowel or bladder control. But some people have no symptoms. You may be able to get relief from the symptoms of spinal stenosis by taking pain medicine. Your doctor may suggest physical therapy and exercises to keep your spine strong and flexible. Some people try steroid shots to reduce swelling. If pain and numbness in your neck, arms, or legs are still so bad that you cannot do your normal activities, you may need surgery. Follow-up care is a key part of your treatment and safety. Be sure to make and go to all appointments, and call your doctor if you are having problems. It's also a good idea to know your test results and keep a list of the medicines you take. How can you care for yourself at home? · Ask your doctor if you can take an over-the-counter pain medicine, such as acetaminophen (Tylenol), ibuprofen (Advil, Motrin), or naproxen (Aleve). Be safe with medicines. Read and follow all instructions on the label. · Do not take two or more pain medicines at the same time unless the doctor told you to. Many pain medicines have acetaminophen, which is Tylenol. Too much acetaminophen (Tylenol) can be harmful. · Change positions often when you are standing or sitting. This may reduce pressure on the spinal cord and its nerves. · When you rest, use pillows or towel rolls to support your neck and head in a comfortable position. · Follow your doctor's instructions about activity. He or she may tell you not to do sports or activities that could injure your neck. · Stretch your neck and shoulders as your doctor or physical therapist recommends.  If your doctor says it is okay to do them, these exercises may help:  ¨ Neck stretches to the side. Keep your shoulders relaxed and slowly tilt your head straight over toward one shoulder. Hold for 15 seconds. Let the weight of your head stretch your muscles. Then do the same toward the other shoulder. ¨ Neck rotations. Keep your chin level and slowly turn your head to one side. Hold for 15 seconds. Then do the same to the other side. ¨ Shoulder rolls. Roll your shoulders up, then back, and then down in a smooth, circular motion. Repeat several times. When should you call for help? Call 911 anytime you think you may need emergency care. For example, call if:    · You are unable to move an arm or a leg at all.   [facility] your doctor now or seek immediate medical care if:    · You have new or worse symptoms in your arms, legs, belly, or buttocks. Symptoms may include:  ¨ Numbness or tingling. ¨ Weakness. ¨ Pain.     · You lose bladder or bowel control.    Watch closely for changes in your health, and be sure to contact your doctor if:    · You do not get better as expected. Where can you learn more? Go to http://gerry-louis.info/. Enter  in the search box to learn more about \"Cervical Spinal Stenosis: Care Instructions. \"  Current as of: November 29, 2017  Content Version: 11.7  © 5589-5877 [employer]. Care instructions adapted under license by [employer] (which disclaims liability or warranty for this information). If you have questions about a medical condition or this instruction, always ask your healthcare professional. [place] [address] any warranty or liability for your use of this information.

## 2018-09-11 NOTE — PROGRESS NOTES
Please call pharmacy. I would like to the patient to have the 10 mg dose filled, not the 25 mg dose.

## 2021-11-02 ENCOUNTER — APPOINTMENT (OUTPATIENT)
Dept: ULTRASOUND IMAGING | Age: 56
End: 2021-11-02
Attending: NURSE PRACTITIONER
Payer: COMMERCIAL

## 2021-11-02 ENCOUNTER — HOSPITAL ENCOUNTER (EMERGENCY)
Age: 56
Discharge: HOME | End: 2021-11-02
Attending: EMERGENCY MEDICINE
Payer: COMMERCIAL

## 2021-11-02 VITALS
SYSTOLIC BLOOD PRESSURE: 151 MMHG | BODY MASS INDEX: 34.78 KG/M2 | RESPIRATION RATE: 18 BRPM | DIASTOLIC BLOOD PRESSURE: 79 MMHG | HEIGHT: 62 IN | OXYGEN SATURATION: 100 % | WEIGHT: 189 LBS | HEART RATE: 81 BPM | TEMPERATURE: 99 F

## 2021-11-02 DIAGNOSIS — R11.2 NON-INTRACTABLE VOMITING WITH NAUSEA, UNSPECIFIED VOMITING TYPE: ICD-10-CM

## 2021-11-02 DIAGNOSIS — E11.65 HYPERGLYCEMIA DUE TO DIABETES MELLITUS (HCC): ICD-10-CM

## 2021-11-02 DIAGNOSIS — K80.20 CALCULUS OF GALLBLADDER WITHOUT CHOLECYSTITIS WITHOUT OBSTRUCTION: Primary | ICD-10-CM

## 2021-11-02 LAB
ALBUMIN SERPL-MCNC: 3.3 G/DL (ref 3.4–5)
ALBUMIN/GLOB SERPL: 0.8 {RATIO} (ref 0.8–1.7)
ALP SERPL-CCNC: 115 U/L (ref 45–117)
ALT SERPL-CCNC: 15 U/L (ref 13–56)
ANION GAP SERPL CALC-SCNC: 10 MMOL/L (ref 3–18)
APPEARANCE UR: CLEAR
AST SERPL-CCNC: 7 U/L (ref 10–38)
BASOPHILS # BLD: 0 K/UL (ref 0–0.1)
BASOPHILS NFR BLD: 0 % (ref 0–2)
BILIRUB SERPL-MCNC: 0.4 MG/DL (ref 0.2–1)
BILIRUB UR QL: NEGATIVE
BUN SERPL-MCNC: 5 MG/DL (ref 7–18)
BUN/CREAT SERPL: 8 (ref 12–20)
CALCIUM SERPL-MCNC: 8.9 MG/DL (ref 8.5–10.1)
CHLORIDE SERPL-SCNC: 102 MMOL/L (ref 100–111)
CO2 SERPL-SCNC: 26 MMOL/L (ref 21–32)
COLOR UR: YELLOW
CREAT SERPL-MCNC: 0.62 MG/DL (ref 0.6–1.3)
DIFFERENTIAL METHOD BLD: NORMAL
EOSINOPHIL # BLD: 0 K/UL (ref 0–0.4)
EOSINOPHIL NFR BLD: 1 % (ref 0–5)
ERYTHROCYTE [DISTWIDTH] IN BLOOD BY AUTOMATED COUNT: 12.2 % (ref 11.6–14.5)
EST. AVERAGE GLUCOSE BLD GHB EST-MCNC: 301 MG/DL
GLOBULIN SER CALC-MCNC: 4.1 G/DL (ref 2–4)
GLUCOSE BLD STRIP.AUTO-MCNC: 220 MG/DL (ref 70–110)
GLUCOSE SERPL-MCNC: 348 MG/DL (ref 74–99)
GLUCOSE UR STRIP.AUTO-MCNC: >1000 MG/DL
HBA1C MFR BLD: 12.1 % (ref 4.2–5.6)
HCT VFR BLD AUTO: 39.6 % (ref 35–45)
HGB BLD-MCNC: 13.4 G/DL (ref 12–16)
HGB UR QL STRIP: NEGATIVE
KETONES UR QL STRIP.AUTO: ABNORMAL MG/DL
LEUKOCYTE ESTERASE UR QL STRIP.AUTO: NEGATIVE
LIPASE SERPL-CCNC: 240 U/L (ref 73–393)
LYMPHOCYTES # BLD: 1.9 K/UL (ref 0.9–3.6)
LYMPHOCYTES NFR BLD: 34 % (ref 21–52)
MAGNESIUM SERPL-MCNC: 1.7 MG/DL (ref 1.6–2.6)
MCH RBC QN AUTO: 27.3 PG (ref 24–34)
MCHC RBC AUTO-ENTMCNC: 33.8 G/DL (ref 31–37)
MCV RBC AUTO: 80.8 FL (ref 78–100)
MONOCYTES # BLD: 0.5 K/UL (ref 0.05–1.2)
MONOCYTES NFR BLD: 8 % (ref 3–10)
NEUTS SEG # BLD: 3.1 K/UL (ref 1.8–8)
NEUTS SEG NFR BLD: 56 % (ref 40–73)
NITRITE UR QL STRIP.AUTO: NEGATIVE
PH UR STRIP: 6.5 [PH] (ref 5–8)
PLATELET # BLD AUTO: 261 K/UL (ref 135–420)
PMV BLD AUTO: 10.3 FL (ref 9.2–11.8)
POTASSIUM SERPL-SCNC: 3.2 MMOL/L (ref 3.5–5.5)
PROT SERPL-MCNC: 7.4 G/DL (ref 6.4–8.2)
PROT UR STRIP-MCNC: NEGATIVE MG/DL
RBC # BLD AUTO: 4.9 M/UL (ref 4.2–5.3)
SODIUM SERPL-SCNC: 138 MMOL/L (ref 136–145)
SP GR UR REFRACTOMETRY: >1.03 (ref 1–1.03)
UROBILINOGEN UR QL STRIP.AUTO: 1 EU/DL (ref 0.2–1)
WBC # BLD AUTO: 5.5 K/UL (ref 4.6–13.2)

## 2021-11-02 PROCEDURE — 74011250636 HC RX REV CODE- 250/636: Performed by: NURSE PRACTITIONER

## 2021-11-02 PROCEDURE — 83735 ASSAY OF MAGNESIUM: CPT

## 2021-11-02 PROCEDURE — 82962 GLUCOSE BLOOD TEST: CPT

## 2021-11-02 PROCEDURE — 96375 TX/PRO/DX INJ NEW DRUG ADDON: CPT

## 2021-11-02 PROCEDURE — 99283 EMERGENCY DEPT VISIT LOW MDM: CPT

## 2021-11-02 PROCEDURE — 96361 HYDRATE IV INFUSION ADD-ON: CPT

## 2021-11-02 PROCEDURE — 74011000250 HC RX REV CODE- 250: Performed by: NURSE PRACTITIONER

## 2021-11-02 PROCEDURE — 74011250637 HC RX REV CODE- 250/637: Performed by: NURSE PRACTITIONER

## 2021-11-02 PROCEDURE — 76705 ECHO EXAM OF ABDOMEN: CPT

## 2021-11-02 PROCEDURE — 85025 COMPLETE CBC W/AUTO DIFF WBC: CPT

## 2021-11-02 PROCEDURE — 80053 COMPREHEN METABOLIC PANEL: CPT

## 2021-11-02 PROCEDURE — 96374 THER/PROPH/DIAG INJ IV PUSH: CPT

## 2021-11-02 PROCEDURE — 83690 ASSAY OF LIPASE: CPT

## 2021-11-02 PROCEDURE — 81003 URINALYSIS AUTO W/O SCOPE: CPT

## 2021-11-02 PROCEDURE — 83036 HEMOGLOBIN GLYCOSYLATED A1C: CPT

## 2021-11-02 RX ORDER — POTASSIUM CHLORIDE 20 MEQ/1
40 TABLET, EXTENDED RELEASE ORAL
Status: COMPLETED | OUTPATIENT
Start: 2021-11-02 | End: 2021-11-02

## 2021-11-02 RX ORDER — LANCETS
EACH MISCELLANEOUS
Qty: 10 EACH | Refills: 0 | Status: SHIPPED | OUTPATIENT
Start: 2021-11-02

## 2021-11-02 RX ORDER — BLOOD-GLUCOSE METER
1 EACH MISCELLANEOUS EVERY MORNING
Qty: 1 EACH | Refills: 0 | Status: SHIPPED | OUTPATIENT
Start: 2021-11-02

## 2021-11-02 RX ORDER — METFORMIN HYDROCHLORIDE 500 MG/1
500 TABLET ORAL 2 TIMES DAILY WITH MEALS
Qty: 30 TABLET | Refills: 0 | Status: SHIPPED | OUTPATIENT
Start: 2021-11-02

## 2021-11-02 RX ORDER — FAMOTIDINE 20 MG/1
20 TABLET, FILM COATED ORAL 2 TIMES DAILY
Qty: 20 TABLET | Refills: 0 | Status: SHIPPED | OUTPATIENT
Start: 2021-11-02 | End: 2021-11-12

## 2021-11-02 RX ORDER — ONDANSETRON 2 MG/ML
4 INJECTION INTRAMUSCULAR; INTRAVENOUS
Status: COMPLETED | OUTPATIENT
Start: 2021-11-02 | End: 2021-11-02

## 2021-11-02 RX ORDER — ONDANSETRON 4 MG/1
4 TABLET, ORALLY DISINTEGRATING ORAL
Qty: 12 TABLET | Refills: 0 | Status: SHIPPED
Start: 2021-11-02 | End: 2022-02-24

## 2021-11-02 RX ORDER — BLOOD SUGAR DIAGNOSTIC
STRIP MISCELLANEOUS
Qty: 100 STRIP | Refills: 0 | Status: SHIPPED | OUTPATIENT
Start: 2021-11-02

## 2021-11-02 RX ORDER — ONDANSETRON 4 MG/1
4 TABLET, ORALLY DISINTEGRATING ORAL
Qty: 12 TABLET | Refills: 0 | Status: SHIPPED | OUTPATIENT
Start: 2021-11-02 | End: 2021-11-02 | Stop reason: SDUPTHER

## 2021-11-02 RX ADMIN — ONDANSETRON 4 MG: 2 INJECTION INTRAMUSCULAR; INTRAVENOUS at 17:39

## 2021-11-02 RX ADMIN — POTASSIUM CHLORIDE 40 MEQ: 1500 TABLET, EXTENDED RELEASE ORAL at 18:30

## 2021-11-02 RX ADMIN — FAMOTIDINE 20 MG: 10 INJECTION INTRAVENOUS at 19:17

## 2021-11-02 RX ADMIN — SODIUM CHLORIDE 1000 ML: 900 INJECTION, SOLUTION INTRAVENOUS at 17:39

## 2021-11-02 NOTE — ED PROVIDER NOTES
EMERGENCY DEPARTMENT HISTORY AND PHYSICAL EXAM    Date: 11/2/2021  Patient Name: Nicole Oropeza    History of Presenting Illness     Chief Complaint   Patient presents with    Abdominal Pain     ruq \"feels like glass moving through my stomach\" h/o gallstones; nausea         History Provided By: Patient      Additional History (Context): Nicole Oropeza is a 55-year-old female who presents to the ER with past medical history significant for diabetes (previously on Metformin about 7 years ago but discontinued for unknown reasons), gallstones, arthritis, migraines, and GERD who presents with complaints of right upper quadrant abdominal pain that started 2 weeks ago accompanied by nausea and vomiting. She states the vomiting was present for the first 2 days and was bilious and nonbloody but resolved on its own. She has had decreased appetite and nausea ever since. She denies any fever, chills, urinary complaints, diarrhea, or constipation. She has not been taking any medications over-the-counter. No exposure to ill contacts. PCP: KULDIP Martin    Current Outpatient Medications   Medication Sig Dispense Refill    famotidine (Pepcid) 20 mg tablet Take 1 Tablet by mouth two (2) times a day for 10 days. 20 Tablet 0    metFORMIN (GLUCOPHAGE) 500 mg tablet Take 1 Tablet by mouth two (2) times daily (with meals). 30 Tablet 0    Blood-Glucose Meter (OneTouch Verio Flex meter) misc 1 Each by Does Not Apply route Every morning. 1 Each 0    glucose blood VI test strips (OneTouch Verio test strips) strip Use with each glucose check daily before breakfast 100 Strip 0    lancets (Microlet Lancet) misc Use with each glucose check daily before breakfast 10 Each 0    ondansetron (Zofran ODT) 4 mg disintegrating tablet Take 1 Tablet by mouth every eight (8) hours as needed for Nausea or Vomiting for up to 12 doses. 12 Tablet 0    meloxicam (MOBIC) 15 mg tablet Take 1 Tab by mouth daily.  30 Tab 2    amitriptyline (ELAVIL) 10 mg tablet Take 1 Tab by mouth nightly. 30 Tab 1       Past History     Past Medical History:  Past Medical History:   Diagnosis Date    Arthritis     Diabetes ([address]     GERD (gastroesophageal reflux disease)     Migraines        Past Surgical History:  Past Surgical History:   Procedure Laterality Date    HX TONSILLECTOMY      HX TUBAL LIGATION         Family History:  Family History   Problem Relation Age of Onset   Jacob Heath Arthritis-rheumatoid Mother     Heart Disease Mother     Hypertension Mother        Social History:  Social History     Tobacco Use    Smoking status: Never Smoker    Smokeless tobacco: Never Used   Vaping Use    Vaping Use: Never used   Substance Use Topics    Alcohol use: No    Drug use: Never       Allergies: Allergies   Allergen Reactions    Pcn [Penicillins] Anaphylaxis    Codeine Nausea and Vomiting    Duragesic [Fentanyl] Other (comments)     dizziness         Review of Systems     Review of Systems   Constitutional: Negative for chills and fever. HENT: Negative for nasal congestion, sore throat, rhinorrhea  Eyes: Negative. Respiratory: negative  cough and negative for shortness of breath. Cardiovascular: Negative for chest pain and palpitations. Gastrointestinal: Positive for abdominal pain, nausea and vomiting. Negative for constipation or diarrhea. Genitourinary: Negative for difficulty urinating, hematuria, and flank pain. Musculoskeletal: Negative for back pain. Negative for gait problem and neck pain. Skin: Negative for rash. Allergic/Immunologic: Negative. Neurological: Negative for dizziness, weakness, numbness and headaches. Psychiatric/Behavioral: Negative. All other systems reviewed and are negative.   All Other Systems Negative  Physical Exam     Vitals:    11/02/21 1614   BP: (!) 151/79   Pulse: 81   Resp: 18   Temp: 99 °F (37.2 °C)   SpO2: 100%   Weight: 85.7 kg (189 lb)   Height: 5' 2\" (1.575 m)     Physical Exam  Vitals and nursing note reviewed. Constitutional:       General: She is not in acute distress. Appearance: Normal appearance. She is not ill-appearing, toxic-appearing or diaphoretic. HENT:      Head: Normocephalic and atraumatic. Nose: Nose normal.      Mouth/Throat:      Mouth: Mucous membranes are moist.      Pharynx: Oropharynx is clear. Eyes:      General: Lids are normal. Vision grossly intact. No scleral icterus. Conjunctiva/sclera: Conjunctivae normal.   Cardiovascular:      Rate and Rhythm: Normal rate and regular rhythm. Pulses: Normal pulses. Heart sounds: Normal heart sounds. No murmur heard. Pulmonary:      Effort: Pulmonary effort is normal. No respiratory distress. Breath sounds: Normal breath sounds. No stridor. No wheezing, rhonchi or rales. Chest:      Chest wall: No tenderness. Abdominal:      General: There is no distension. Palpations: Abdomen is soft. There is no mass. Tenderness: There is abdominal tenderness in the right upper quadrant and epigastric area. There is guarding (Minimal). There is no right CVA tenderness or left CVA tenderness. Hernia: No hernia is present. Musculoskeletal:         General: Normal range of motion. Cervical back: Full passive range of motion without pain, normal range of motion and neck supple. No tenderness. Lymphadenopathy:      Cervical: No cervical adenopathy. Skin:     General: Skin is warm and dry. Capillary Refill: Capillary refill takes less than 2 seconds. Neurological:      General: No focal deficit present. Mental Status: She is alert and oriented to person, place, and time. Psychiatric:         Mood and Affect: Mood normal.         Behavior: Behavior normal. Behavior is cooperative.            Diagnostic Study Results     Labs -     Recent Results (from the past 12 hour(s))   CBC WITH AUTOMATED DIFF    Collection Time: 11/02/21  4:30 PM   Result Value Ref Range    WBC 5.5 4.6 - 13.2 K/uL RBC 4.90 4.20 - 5.30 M/uL    HGB 13.4 12.0 - 16.0 g/dL    HCT 39.6 35.0 - 45.0 %    MCV 80.8 78.0 - 100.0 FL    MCH 27.3 24.0 - 34.0 PG    MCHC 33.8 31.0 - 37.0 g/dL    RDW 12.2 11.6 - 14.5 %    PLATELET 812 816 - 415 K/uL    MPV 10.3 9.2 - 11.8 FL    NEUTROPHILS 56 40 - 73 %    LYMPHOCYTES 34 21 - 52 %    MONOCYTES 8 3 - 10 %    EOSINOPHILS 1 0 - 5 %    BASOPHILS 0 0 - 2 %    ABS. NEUTROPHILS 3.1 1.8 - 8.0 K/UL    ABS. LYMPHOCYTES 1.9 0.9 - 3.6 K/UL    ABS. MONOCYTES 0.5 0.05 - 1.2 K/UL    ABS. EOSINOPHILS 0.0 0.0 - 0.4 K/UL    ABS. BASOPHILS 0.0 0.0 - 0.1 K/UL    DF AUTOMATED     METABOLIC PANEL, COMPREHENSIVE    Collection Time: 11/02/21  4:30 PM   Result Value Ref Range    Sodium 138 136 - 145 mmol/L    Potassium 3.2 (L) 3.5 - 5.5 mmol/L    Chloride 102 100 - 111 mmol/L    CO2 26 21 - 32 mmol/L    Anion gap 10 3.0 - 18 mmol/L    Glucose 348 (H) 74 - 99 mg/dL    BUN 5 (L) 7.0 - 18 MG/DL    Creatinine 0.62 0.6 - 1.3 MG/DL    BUN/Creatinine ratio 8 (L) 12 - 20      GFR est AA >60 >60 ml/min/1.73m2    GFR est non-AA >60 >60 ml/min/1.73m2    Calcium 8.9 8.5 - 10.1 MG/DL    Bilirubin, total 0.4 0.2 - 1.0 MG/DL    ALT (SGPT) 15 13 - 56 U/L    AST (SGOT) 7 (L) 10 - 38 U/L    Alk.  phosphatase 115 45 - 117 U/L    Protein, total 7.4 6.4 - 8.2 g/dL    Albumin 3.3 (L) 3.4 - 5.0 g/dL    Globulin 4.1 (H) 2.0 - 4.0 g/dL    A-G Ratio 0.8 0.8 - 1.7     LIPASE    Collection Time: 11/02/21  4:30 PM   Result Value Ref Range    Lipase 240 73 - 393 U/L   MAGNESIUM    Collection Time: 11/02/21  4:30 PM   Result Value Ref Range    Magnesium 1.7 1.6 - 2.6 mg/dL   URINALYSIS W/ RFLX MICROSCOPIC    Collection Time: 11/02/21  6:47 PM   Result Value Ref Range    Color YELLOW      Appearance CLEAR      Specific gravity >1.030 (H) 1.005 - 1.030    pH (UA) 6.5 5.0 - 8.0      Protein Negative NEG mg/dL    Glucose >1,000 (A) NEG mg/dL    Ketone TRACE (A) NEG mg/dL    Bilirubin Negative NEG      Blood Negative NEG      Urobilinogen 1.0 0.2 - 1.0 EU/dL    Nitrites Negative NEG      Leukocyte Esterase Negative NEG     GLUCOSE, POC    Collection Time: 11/02/21  7:24 PM   Result Value Ref Range    Glucose (POC) 220 (H) 70 - 110 mg/dL       Radiologic Studies -   [facility]   Final Result      1. Gallbladder is filled with gallstones, but no other findings to suggest   cholecystitis. This favors biliary colic. 2. Likely profound hepatic steatosis. CT Results  (Last 48 hours)    None        CXR Results  (Last 48 hours)    None            Medical Decision Making   I am the first provider for this patient. I reviewed the vital signs, available nursing notes, past medical history, past surgical history, family history and social history. Vital Signs-Reviewed the patient's vital signs. Records Reviewed: Nursing notes, old medical records and any previous labs, imaging, visits, consultations pertinent to patient care    Procedures:  Procedures      ED Course: Progress Notes, Reevaluation, and Consults:  5:04 PM  Initial assessment performed. The patients presenting problems have been discussed, and they/their family are in agreement with the care plan formulated and outlined with them. I have encouraged them to ask questions as they arise throughout their visit. 5:15 PM CBC shows no leukocytosis, anemia, shift, or bands. CMP shows a potassium of 3.2 and a glucose of 348. Oral potassium ordered. Normal anion gap and CO2 and no physical evidence of DKA on exam.  Liver enzymes are within normal limits and lipase is normal.  Urinalysis shows trace ketones and glucose in the urine consistent with hyperglycemia. Patient states she was previously on Metformin which she took about 7 years ago and after taking for 1 to 2 months she was discontinued off this medication is unsure why. She never checked her blood sugar regularly is unsure what her last A1c was. Nausea improved after Zofran.     6:30 PM ultrasound of the gallbladder shows many gallstones with normal gallbladder wall thickness and normal CBD. Negative Valles sign. No evidence of acute cholecystitis. Also hepatic steatosis. After IV fluids, Pepcid, and Zofran patient is feeling much better. Abdomen is soft nontender nausea improved. She is tolerating oral intake well. I spent a considerable amount of time discussing her results including hyperglycemia and need to start medications. Her recheck of blood glucose was 220. She is feeling much better. She prefers to be treated outpatient. We will give her a referral for general surgery for the gallbladder as well as information on gallbladder disease and low-fat diet. Will refer her to PCP and endocrinology and start her on low-dose Metformin and give her a glucometer and have her check her sugar daily before breakfast and keep a log. We also added on an A1c which is pending. Patient has been much better and is happy with this plan. She appears well-hydrated vitals are stable and she remains afebrile. Patient has never used a glucometer, however, her  who is at the bedside is a diabetic and checks his blood sugar regularly. He states he can help her check her blood sugar with a new glucometer which I ordered. ER return precautions discussed at length. Provider Notes (Medical Decision Making):   Patient presents ambulatory in no acute distress, well-hydrated, non-toxic in appearance, with slight elevation in blood pressure but otherwise normal vitals. Benign exam of abdomen with tenderness in the right upper quadrant and epigastrium with negative Valles sign and no peritoneal signs. Will obtain appropriate studies to evaluate patient's complaints and treat symptomatically. Will disposition after reassessment assuming no clinical change or worsening and appropriate response to symptomatic treatment. MED RECONCILIATION:  No current facility-administered medications for this encounter.      Current Outpatient Medications   Medication Sig    famotidine (Pepcid) 20 mg tablet Take 1 Tablet by mouth two (2) times a day for 10 days.  metFORMIN (GLUCOPHAGE) 500 mg tablet Take 1 Tablet by mouth two (2) times daily (with meals).  Blood-Glucose Meter (OneTouch Verio Flex meter) misc 1 Each by Does Not Apply route Every morning.  glucose blood VI test strips (OneTouch Verio test strips) strip Use with each glucose check daily before breakfast    lancets (Microlet Lancet) misc Use with each glucose check daily before breakfast    ondansetron (Zofran ODT) 4 mg disintegrating tablet Take 1 Tablet by mouth every eight (8) hours as needed for Nausea or Vomiting for up to 12 doses.  meloxicam (MOBIC) 15 mg tablet Take 1 Tab by mouth daily.  amitriptyline (ELAVIL) 10 mg tablet Take 1 Tab by mouth nightly. Disposition:  Home in stable condition. DISCHARGE NOTE:     Patient has been reexamined. Patient has no new complaints, changes, or physical findings. Patient demonstrates understanding of current diagnoses and is in agreement with the treatment plan. They are advised that while the likelihood of serious underlying condition is low at this point given the evaluation performed today, we cannot fully rule it out. They are advised to immediately return with any new symptoms or worsening of current condition. Care plan outlined and precautions discussed. Discussed proper way to take medications. Medication use, risk/benefit, side effects and precautions discussed in detail. Discussed treatment plan, return precautions, symptomatic relief, and expected time to improvement. All questions answered. Patient is stable for discharge and outpatient management. Patient is ready to go home.     Follow-up Information     Follow up With Specialties Details Why Contact Info    Marissa [facility], Physician Assistant Schedule an appointment as soon as possible for a visit  Follow-up from the Emergency [address] [facility] [address]  [phone]      [facility] ENDOCRINE & METABLOIC DISORDERS  Schedule an appointment as soon as possible for a visit  Follow-up from the Emergency Department for diabetes [address] [facility]    Roger Ren MD General Surgery Schedule an appointment as soon as possible for a visit  Follow-up from the Emergency Department for gallstones [address]  [address]  [phone]            Current Discharge Medication List      START taking these medications    Details   famotidine (Pepcid) 20 mg tablet Take 1 Tablet by mouth two (2) times a day for 10 days. Qty: 20 Tablet, Refills: 0  Start date: 11/2/2021, End date: 11/12/2021      metFORMIN (GLUCOPHAGE) 500 mg tablet Take 1 Tablet by mouth two (2) times daily (with meals). Qty: 30 Tablet, Refills: 0  Start date: 11/2/2021      Blood-Glucose Meter (OneTouch Verio Flex meter) misc 1 Each by Does Not Apply route Every morning. Qty: 1 Each, Refills: 0  Start date: 11/2/2021      glucose blood VI test strips (OneTouch Verio test strips) strip Use with each glucose check daily before breakfast  Qty: 100 Strip, Refills: 0  Start date: 11/2/2021      lancets (Microlet Lancet) misc Use with each glucose check daily before breakfast  Qty: 10 Each, Refills: 0  Start date: 11/2/2021      ondansetron (Zofran ODT) 4 mg disintegrating tablet Take 1 Tablet by mouth every eight (8) hours as needed for Nausea or Vomiting for up to 12 doses. Qty: 12 Tablet, Refills: 0  Start date: 11/2/2021                   Diagnosis     Clinical Impression:   1. Calculus of gallbladder without cholecystitis without obstruction    2. Hyperglycemia due to diabetes mellitus ([address]    3. Non-intractable vomiting with nausea, unspecified vomiting type        Dictation disclaimer:  Please note that this dictation was completed with [employer], the [employer] voice recognition software.   Quite often unanticipated grammatical, syntax, homophones, and other interpretive errors are inadvertently transcribed by the computer software. Please disregard these errors. Please excuse any errors that have escaped final proofreading.

## 2021-11-02 NOTE — ED NOTES
Pt presents with c/o RUQ pain x 2 weeks accompanied by nausea. She reports pain is worse after eating and \"feels like glass going through my stomach\".

## 2022-01-06 ENCOUNTER — OFFICE VISIT (OUTPATIENT)
Dept: ORTHOPEDIC SURGERY | Age: 57
End: 2022-01-06
Payer: COMMERCIAL

## 2022-01-06 VITALS
DIASTOLIC BLOOD PRESSURE: 80 MMHG | OXYGEN SATURATION: 98 % | TEMPERATURE: 98.6 F | BODY MASS INDEX: 31.65 KG/M2 | SYSTOLIC BLOOD PRESSURE: 140 MMHG | WEIGHT: 172 LBS | HEART RATE: 86 BPM | HEIGHT: 62 IN

## 2022-01-06 DIAGNOSIS — G57.11 MERALGIA PARESTHETICA, RIGHT: ICD-10-CM

## 2022-01-06 DIAGNOSIS — M48.02 CERVICAL STENOSIS OF SPINAL CANAL: Primary | ICD-10-CM

## 2022-01-06 DIAGNOSIS — M47.816 LUMBAR SPONDYLOSIS: ICD-10-CM

## 2022-01-06 PROCEDURE — 99204 OFFICE O/P NEW MOD 45 MIN: CPT | Performed by: PHYSICAL MEDICINE & REHABILITATION

## 2022-01-06 PROCEDURE — 96372 THER/PROPH/DIAG INJ SC/IM: CPT | Performed by: PHYSICAL MEDICINE & REHABILITATION

## 2022-01-06 RX ORDER — GABAPENTIN 300 MG/1
CAPSULE ORAL
Qty: 90 CAPSULE | Refills: 1 | Status: SHIPPED | OUTPATIENT
Start: 2022-01-06 | End: 2022-02-04 | Stop reason: SDUPTHER

## 2022-01-06 RX ORDER — NEOMYCIN SULFATE, POLYMYXIN B SULFATE, HYDROCORTISONE 3.5; 10000; 1 MG/ML; [USP'U]/ML; MG/ML
SOLUTION/ DROPS AURICULAR (OTIC)
COMMUNITY
Start: 2021-02-28 | End: 2022-01-06

## 2022-01-06 RX ORDER — KETOROLAC TROMETHAMINE 30 MG/ML
30 INJECTION, SOLUTION INTRAMUSCULAR; INTRAVENOUS
Status: COMPLETED | OUTPATIENT
Start: 2022-01-06 | End: 2022-01-06

## 2022-01-06 RX ORDER — MONTELUKAST SODIUM 10 MG/1
10 TABLET ORAL
COMMUNITY
Start: 2021-03-12

## 2022-01-06 RX ORDER — AZELASTINE 1 MG/ML
2 SPRAY, METERED NASAL
COMMUNITY
Start: 2021-03-12

## 2022-01-06 RX ADMIN — KETOROLAC TROMETHAMINE 30 MG: 30 INJECTION, SOLUTION INTRAMUSCULAR; INTRAVENOUS at 14:18

## 2022-01-06 NOTE — LETTER
1/8/2022    Patient: Lisbet Abbott   YOB: 1965   Date of Visit: 1/6/2022     Dru Dan  [address] [place] [address] [address] [facility] [address]  Via Fax: [phone]    Dear KULDIP Dan,      Thank you for referring Ms. Lisbet Abbott to [address] [place] [address] [facility] for evaluation. My notes for this consultation are attached. If you have questions, please do not hesitate to call me. I look forward to following your patient along with you.       Sincerely,    Brittani Maya MD

## 2022-01-06 NOTE — PROGRESS NOTES
Preeti Rodriguez presents today for   Chief Complaint   Patient presents with    Back Pain       Is someone accompanying this pt? no    Is the patient using any DME equipment during OV? no    Depression Screening:  No flowsheet data found. Learning Assessment:  No flowsheet data found. Abuse Screening:  No flowsheet data found. Fall Risk  No flowsheet data found. OPIOID RISK TOOL  No flowsheet data found. Coordination of Care:  1. Have you been to the ER, urgent care clinic since your last visit? Yes November 2021 gallbladder   Hospitalized since your last visit?no    2. Have you seen or consulted any other health care providers outside of the [address] [place] [address] since your last visit? no Include any pap smears or colon screening.  no

## 2022-01-06 NOTE — PROGRESS NOTES
[facility], [address]  [place], [address] [place] [address] Street  Phone: [phone]  Fax: [phone]        Christiano Thacker  : 1965  PCP: KULDIP Pratt    NEW PATIENT EVALUATION      ASSESSMENT AND PLAN    Diagnoses and all orders for this visit:    1. Cervical stenosis of spinal canal  -     gabapentin (NEURONTIN) 300 mg capsule; Month #1: One po qhs x 1 week, then one po bid x1 week, then one po tid thereafter  -     ketorolac (TORADOL) injection 30 mg  -     MRI CERV SPINE WO CONT; Future    2. Meralgia paresthetica, right  -     gabapentin (NEURONTIN) 300 mg capsule; Month #1: One po qhs x 1 week, then one po bid x1 week, then one po tid thereafter  -     ketorolac (TORADOL) injection 30 mg    3. Lumbar spondylosis  -     ketorolac (TORADOL) injection 30 mg         1. Marissa Olguin is a 55 y.o. female with previously known cervical stenosis presenting with symptoms of cervical myelopathy. .  I feel that her meralgia paresthetica is related to her recent diagnosis of uncontrolled diabetes. 2. Trial of Gabapentin 300 mg TID by ramp  3. Toradol 30 mg IM x 1 now   4. Cervical MRI for 3 months increasing pain, weakness B/L arms, numbness, known stenosis   5. No lifting, avoid overhead activities. TORADOL INJECTION:  Administrations This Visit     ketorolac (TORADOL) injection 30 mg     Admin Date  2022  14:18 Action  Given Dose  30 mg Route  IntraMUSCular Site  Right Gluteus Rex Administered By  Krystal Nassar LPN    NDC: 42941-009-56    Patient Supplied?: No                HISTORY OF PRESENT ILLNESS  Marissa Olguin is seen today in consultation for neck and low back pain x 3 months. Neck = low back. Last seen , discussed surgery for cervical radiculopathy. She reports that her pain is progressively worsening. She notes burning and sensitivity in her right anterolateral thigh. She has numbness and tingling in her arms and legs.  She also mentions issues with her balance. Patient states that she has insomnia at night due to pain. Last A1C 14. Recent diagnosis of diabetes. She is miserable and at the point of surgery. Pain Assessment  1/6/2022   Location of Pain Back   Location Modifiers -   Severity of Pain 7   Quality of Pain Other (Comment)   Quality of Pain Comment all of it   Duration of Pain Persistent   Frequency of Pain Constant   Aggravating Factors Other (Comment)   Aggravating Factors Comment any and everything   Limiting Behavior No   Relieving Factors Heat;Nothing   Result of Injury No       Onset of pain: Chronic, worse since 11/2021    Does pain radiate into extremities: right anterolateral thigh, bilateral upper extremities    Denies persistent fevers, chills, weight changes, saddle paresthesias, and neurogenic bowel or bladder symptoms. Affirms constipation    Investigations:   C MRI 2012: mild stenosis C4-5  L MRI 2012: disc osteophyte complex L5-S1  Spine surgery consult: unknown    Treatments:  Physical therapy: yes  Spinal injections: yes  Spinal surgery- no  Beneficial medications: Elavil, Mobic  Failed medications: none    Work Status:   Pertinent PMHx:  DM, GERD, migrianes GFR normal 11/2021.      Visit Vitals  BP (!) 140/80 (BP 1 Location: Right upper arm, BP Patient Position: Sitting, BP Cuff Size: Adult) Comment: pt asymptomatic, MD aware   Pulse 86   Temp 98.6 °F (37 °C) (Temporal)   Ht 5' 2\" (1.575 m)   Wt 172 lb (78 kg)   LMP 12/22/2021   SpO2 98%   BMI 31.46 kg/m²       PHYSICAL EXAM    Decreased IR and ER due to shoulder pain  abduction left shoulder 90 degrees , right 120 degrees  Tender biceps tendon  4/5 weakness intrinsic and pinch   positive Tinel's right wrist and elbow  TTP L4-5  Forward flexed  SLR negative  Negative Veloz's  DTRs 1+ UE, 2+ patella  Moderate difficulty with tandem gait      Past Medical History:   Diagnosis Date    Arthritis     Diabetes ([address]     GERD (gastroesophageal reflux disease)     Migraines         Past Surgical History:   Procedure Laterality Date    HX TONSILLECTOMY      HX TUBAL LIGATION           Current Outpatient Medications   Medication Sig Dispense Refill    azelastine (ASTELIN) 137 mcg (0.1 %) nasal spray 1 puff in each nostril      montelukast (Singulair) 10 mg tablet 1 tablet      gabapentin (NEURONTIN) 300 mg capsule Month #1: One po qhs x 1 week, then one po bid x1 week, then one po tid thereafter 90 Capsule 1    metFORMIN (GLUCOPHAGE) 500 mg tablet Take 1 Tablet by mouth two (2) times daily (with meals). 30 Tablet 0    Blood-Glucose Meter (OneTouch Verio Flex meter) misc 1 Each by Does Not Apply route Every morning. 1 Each 0    glucose blood VI test strips (OneTouch Verio test strips) strip Use with each glucose check daily before breakfast 100 Strip 0    lancets (Microlet Lancet) misc Use with each glucose check daily before breakfast 10 Each 0    ondansetron (Zofran ODT) 4 mg disintegrating tablet Take 1 Tablet by mouth every eight (8) hours as needed for Nausea or Vomiting for up to 12 doses. 12 Tablet 0    meloxicam (MOBIC) 15 mg tablet Take 1 Tab by mouth daily.  30 Tab 2

## 2022-01-06 NOTE — PROGRESS NOTES
Consent was explained to the pt and signed. No questions or concerns voiced at this time. Pt given 30mg/1ml of toradol IM in right gluteus. No sign or symptoms of infection noted at injection site. There was no bleeding, swelling or leaking noted after injection. Pt handed injection information sheet to take home. Ms. Jaylon Vera tolerated the injection well and did not want to wait in the exam room for observation. She ambulated to check out without any issues.

## 2022-01-15 DIAGNOSIS — M48.02 CERVICAL STENOSIS OF SPINAL CANAL: ICD-10-CM

## 2022-01-18 ENCOUNTER — HOSPITAL ENCOUNTER (OUTPATIENT)
Age: 57
Discharge: HOME | End: 2022-01-18
Attending: PHYSICAL MEDICINE & REHABILITATION
Payer: COMMERCIAL

## 2022-01-18 PROCEDURE — 72141 MRI NECK SPINE W/O DYE: CPT

## 2022-02-04 DIAGNOSIS — M48.02 CERVICAL STENOSIS OF SPINAL CANAL: ICD-10-CM

## 2022-02-04 DIAGNOSIS — G57.11 MERALGIA PARESTHETICA, RIGHT: ICD-10-CM

## 2022-02-06 RX ORDER — GABAPENTIN 300 MG/1
300 CAPSULE ORAL 3 TIMES DAILY
Qty: 90 CAPSULE | Refills: 1 | Status: SHIPPED | OUTPATIENT
Start: 2022-02-06 | End: 2022-02-09 | Stop reason: SDUPTHER

## 2022-02-09 RX ORDER — GABAPENTIN 300 MG/1
300 CAPSULE ORAL 3 TIMES DAILY
Qty: 270 CAPSULE | Refills: 0 | Status: SHIPPED
Start: 2022-02-09 | End: 2022-02-24 | Stop reason: SINTOL

## 2022-02-09 NOTE — TELEPHONE ENCOUNTER
Ulices Jaramillo is requesting a 90 day supply  Previous Rx was sent to [facility]     Pending Prescriptions Disp Refills    gabapentin (NEURONTIN) 300 mg capsule 270 Capsule 0     Sig: Take 1 Capsule by mouth three (3) times daily. Max Daily Amount: 900 mg. Signed Prescriptions Disp Refills    gabapentin (NEURONTIN) 300 mg capsule 90 Capsule 1     Sig: Take 1 Capsule by mouth three (3) times daily. Max Daily Amount: 900 mg.      Authorizing Provider: Cielo Morrison

## 2022-02-24 ENCOUNTER — OFFICE VISIT (OUTPATIENT)
Dept: ORTHOPEDIC SURGERY | Age: 57
End: 2022-02-24
Payer: COMMERCIAL

## 2022-02-24 VITALS
OXYGEN SATURATION: 100 % | WEIGHT: 177 LBS | TEMPERATURE: 98.4 F | BODY MASS INDEX: 32.57 KG/M2 | HEIGHT: 62 IN | HEART RATE: 77 BPM

## 2022-02-24 DIAGNOSIS — G56.01 CARPAL TUNNEL SYNDROME OF RIGHT WRIST: ICD-10-CM

## 2022-02-24 DIAGNOSIS — M48.02 CERVICAL STENOSIS OF SPINAL CANAL: Primary | ICD-10-CM

## 2022-02-24 DIAGNOSIS — M26.623 BILATERAL TEMPOROMANDIBULAR JOINT PAIN: ICD-10-CM

## 2022-02-24 DIAGNOSIS — G57.11 MERALGIA PARESTHETICA, RIGHT: ICD-10-CM

## 2022-02-24 PROCEDURE — 99214 OFFICE O/P EST MOD 30 MIN: CPT | Performed by: PHYSICAL MEDICINE & REHABILITATION

## 2022-02-24 RX ORDER — PREGABALIN 75 MG/1
75 CAPSULE ORAL 2 TIMES DAILY
Qty: 60 CAPSULE | Refills: 1 | Status: SHIPPED
Start: 2022-02-24 | End: 2022-04-22 | Stop reason: SINTOL

## 2022-02-24 RX ORDER — MELOXICAM 15 MG/1
15 TABLET ORAL
Qty: 30 TABLET | Refills: 2 | Status: SHIPPED | OUTPATIENT
Start: 2022-02-24 | End: 2022-06-21

## 2022-02-24 RX ORDER — INSULIN GLARGINE 300 U/ML
38 INJECTION, SOLUTION SUBCUTANEOUS DAILY
COMMUNITY
Start: 2022-02-13

## 2022-02-24 NOTE — LETTER
2/25/2022    Patient: Yury Padgett   YOB: 1965   Date of Visit: 2/24/2022     Aram Holt, [address]  [address] [place] [id number]  Via Fax: [phone]    Dear KULDIP Pedro,      Thank you for referring Ms. Yury Padgett to [facility] for evaluation. My notes for this consultation are attached. If you have questions, please do not hesitate to call me. I look forward to following your patient along with you.       Sincerely,    Griffin Osuna MD

## 2022-02-24 NOTE — PROGRESS NOTES
Owen Lim presents today for   Chief Complaint   Patient presents with    Neck Pain    Arm Pain    Shoulder Pain       Is someone accompanying this pt? no    Is the patient using any DME equipment during OV? no    Learning Assessment:  Learning Assessment 2/24/2022   PRIMARY LEARNER Patient   PRIMARY LANGUAGE ENGLISH   LEARNER PREFERENCE PRIMARY LISTENING   ANSWERED BY patient   RELATIONSHIP SELF       Coordination of Care:  1. Have you been to the ER, urgent care clinic since your last visit? no  Hospitalized since your last visit? no    2. Have you seen or consulted any other health care providers outside of the [address] [place] [address] since your last visit? Yes, endocrinology Include any pap smears or colon screening.  no

## 2022-02-24 NOTE — PROGRESS NOTES
[facility] [address]  [place], [address] [place] [address] Street  Phone: [phone]  Fax: [phone]        Sunni Rivera  : 1965  PCP: KULDIP Spencer    PROGRESS NOTE      ASSESSMENT AND PLAN    Diagnoses and all orders for this visit:    1. Cervical stenosis of spinal canal  -     pregabalin (Lyrica) 75 mg capsule; Take 1 Capsule by mouth two (2) times a day. Max Daily Amount: 150 mg. Month #1: One po qhs x 1week, then one po bid thereafter. Subsequent month one po bid  -     meloxicam (MOBIC) 15 mg tablet; Take 1 Tablet by mouth daily as needed for Pain. 2. Carpal tunnel syndrome of right wrist    3. Meralgia paresthetica, right    4. Bilateral temporomandibular joint pain        1. Jose Knutson is a 55 y.o. female, [address] employee, with ongoing neck and upper extremity pain. Discussed indications for cervical decompression. 2. DC Gabapentin, ineffective for upper extremities. 3. Trial of Lyrica 75 mg BID  4. RF Mobic          HISTORY OF PRESENT ILLNESS      Jose Knutson is a 55 y.o. female presents for follow up of neck and shoulder pain. Cervical MRI follow-up. MRI reviewed with patient. She reports that her neck pain is progressively worsening. The pain radiates into her shoulders and arms. She also has numbness and tingling BUE. She continues to have issues with her balance. Patient has insomnia due to pain. She also complains of jaw pain. She states that Gabapentin provides relief for her right leg pain but not for her other extremities. Denies side effects. She is also been using OTC Lidoderm patches for her right leg with benefit. She received no benefit from the Toradol injection given last visit. Pain Assessment  2022   Location of Pain Neck; Shoulder;Arm   Location Modifiers Right;Left   Severity of Pain 6   Quality of Pain Throbbing; Sharp;Dull;Aching;Burning; Other (Comment)   Quality of Pain Comment stabbing, n/t to fingers/hands   Duration of Pain Persistent   Frequency of Pain Constant   Aggravating Factors Other (Comment)   Aggravating Factors Comment laying down   Limiting Behavior Yes   Relieving Factors Nothing   Result of Injury No         Investigations:   C MRI 1/2022: congenital stenosis, no cord signal change, greatest at C5/6  C MRI 2012: mild stenosis C4-5  L MRI 2012: disc osteophyte complex L5-S1  Spine surgery consult: unknown     Treatments:  Physical therapy: yes  Spinal injections: yes  Spinal surgery- no  Beneficial medications: Elavil, Mobic, OTC Lidoderm patches, Gabapentin   Failed medications:  Toradol, Aleve     Work Status:  x 31 years, [employer]  Pertinent PMHx:  DM, GERD, migraines GFR normal 11/2021.        PHYSICAL EXAMINATION    Visit Vitals  Pulse 77   Temp 98.4 °F (36.9 °C) (Temporal)   Ht 5' 2\" (1.575 m)   Wt 177 lb (80.3 kg)   SpO2 100% Comment: RA   BMI 32.37 kg/m²       Pain and clicking TMJ  Intrinsic weakness 4/5  Deltoids, triceps, biceps intact  No clonus  Intact hip flexors  Straight leg raise negative  Non-antalgic gait                Written by Jayshree Zavala, as dictated by Sharonda Gallagher MD.

## 2022-04-21 ENCOUNTER — OFFICE VISIT (OUTPATIENT)
Dept: ORTHOPEDIC SURGERY | Age: 57
End: 2022-04-21
Payer: COMMERCIAL

## 2022-04-21 VITALS
RESPIRATION RATE: 17 BRPM | BODY MASS INDEX: 30.95 KG/M2 | WEIGHT: 169.2 LBS | TEMPERATURE: 97.6 F | OXYGEN SATURATION: 100 % | HEART RATE: 83 BPM

## 2022-04-21 DIAGNOSIS — G89.29 CHRONIC PAIN OF BOTH SHOULDERS: ICD-10-CM

## 2022-04-21 DIAGNOSIS — M25.511 CHRONIC PAIN OF BOTH SHOULDERS: ICD-10-CM

## 2022-04-21 DIAGNOSIS — M48.02 CERVICAL STENOSIS OF SPINAL CANAL: Primary | ICD-10-CM

## 2022-04-21 DIAGNOSIS — M25.512 CHRONIC PAIN OF BOTH SHOULDERS: ICD-10-CM

## 2022-04-21 PROCEDURE — 99214 OFFICE O/P EST MOD 30 MIN: CPT | Performed by: PHYSICAL MEDICINE & REHABILITATION

## 2022-04-21 RX ORDER — GABAPENTIN 300 MG/1
300 CAPSULE ORAL 3 TIMES DAILY
COMMUNITY
End: 2022-06-01 | Stop reason: SDUPTHER

## 2022-04-21 RX ORDER — AMITRIPTYLINE HYDROCHLORIDE 25 MG/1
25 TABLET, FILM COATED ORAL
Qty: 30 TABLET | Refills: 2 | Status: SHIPPED | OUTPATIENT
Start: 2022-04-21 | End: 2022-06-21

## 2022-04-21 NOTE — LETTER
4/22/2022    Patient: Jose Alfredo Garcia   YOB: 1965   Date of Visit: 4/21/2022     Dru Omer  [address] [place] [id number]  Via Fax: [phone]    Dear KULDIP Omer,      Thank you for referring Ms. Jose Alfredo Garcia to [facility] for evaluation. My notes for this consultation are attached. If you have questions, please do not hesitate to call me. I look forward to following your patient along with you.       Sincerely,    Nir Castellon MD

## 2022-04-21 NOTE — PROGRESS NOTES
[facility]  [address], [address]  [place] [address]  Phone: [phone]  Fax: [phone]        Yris Hernandez  : 1965  PCP: KULDIP Pryor    PROGRESS NOTE      ASSESSMENT AND PLAN    Diagnoses and all orders for this visit:    1. Cervical stenosis of spinal canal  -     amitriptyline (ELAVIL) 25 mg tablet; Take 1 Tablet by mouth nightly. 2. Chronic pain of both shoulders  -     REFERRAL TO ORTHOPEDICS        1. Esther Best is a 55 y.o. female [address] [facility] with cervical stenosis. 2. DC Lyrica  3. Trial of Elavil 25 mg. Patient to send [employer] message in one month about her response to this medication  4. Referral to Dr. Cody Estrada for eval shoulder pain, possible injection  5. Discussed surgery as a treatment option    Follow-up and Dispositions    · Return in about 3 months (around 2022) for medication management. HISTORY OF PRESENT ILLNESS      Esther Best is a 55 y.o. female presents for follow up of neck pain. LV trial of Lyrica, DC Gabapentin. She reports that her pain has gotten worse. The pain continues to radiate into her shoulder and arms. She notes numbness, tingling, and weakness in her hands. Patient still has issues with her balance. She stopped taking Lyrica because it caused nausea and vomiting.      Pain Assessment  2022   Location of Pain Back;Arm;Neck;Leg   Location Modifiers -   Severity of Pain 6   Quality of Pain -   Quality of Pain Comment -   Duration of Pain Persistent   Frequency of Pain Constant   Aggravating Factors -   Aggravating Factors Comment -   Limiting Behavior -   Relieving Factors -   Result of Injury -         Investigations:   C MRI 2022: congenital stenosis, no cord signal change, greatest at C5/6  C MRI : mild stenosis C4-5  L MRI 2012: disc osteophyte complex L5-S1  Spine surgery consult: unknown     Treatments:  Physical therapy: yes  Spinal injections: yes  Spinal surgery- no  Beneficial medications: Elavil, Mobic, OTC Lidoderm patches, Gabapentin   Failed medications: Toradol, Aleve, Lyrica (nausea)     Work Status:  x 31 years, [employer]  Pertinent PMHx:  DM, GERD, migraines GFR normal 11/2021    PHYSICAL EXAMINATION    Visit Vitals  Pulse 83   Temp 97.6 °F (36.4 °C)   Resp 17   Wt 169 lb 3.2 oz (76.7 kg)   SpO2 100%   BMI 30.95 kg/m²       B/L weakness of intrinsics and pinch  Negative Veloz's  Left shoulder abduction 90 degrees, right shoulder 110 degrees              Written by Jovany Smith, as dictated by Bernabe Dennis MD.

## 2022-04-21 NOTE — PATIENT INSTRUCTIONS
Cervical Spinal Stenosis: Care Instructions  Your Care Instructions     Spinal stenosis is a narrowing of the canal that surrounds the spinal cord and nerve roots. Sometimes bone and other tissue grow into this canal and press on the nerves that branch out from the spinal cord. This can happen as a part of aging. When the narrowing happens in your neck, it's called cervical spinal stenosis. It often causes stiffness, pain, numbness, and weakness in the neck, shoulders, arms, hands, or legs. It can even cause problems with your balance, coordination, and bowel or bladder control. But some people have no symptoms. You may be able to get relief from the symptoms of spinal stenosis by taking pain medicine. Your doctor may suggest physical therapy and exercises to keep your spine strong and flexible. Some people try steroid shots to reduce swelling. If pain and numbness in your neck, arms, or legs are still so bad that you cannot do your normal activities, you may need surgery. Follow-up care is a key part of your treatment and safety. Be sure to make and go to all appointments, and call your doctor if you are having problems. It's also a good idea to know your test results and keep a list of the medicines you take. How can you care for yourself at home? · Ask your doctor if you can take an over-the-counter pain medicine, such as acetaminophen (Tylenol), ibuprofen (Advil, Motrin), or naproxen (Aleve). Be safe with medicines. Read and follow all instructions on the label. · Do not take two or more pain medicines at the same time unless the doctor told you to. Many pain medicines have acetaminophen, which is Tylenol. Too much acetaminophen (Tylenol) can be harmful. · Change positions often when you are standing or sitting. This may reduce pressure on the spinal cord and its nerves. · When you rest, use pillows or towel rolls to support your neck and head in a comfortable position.   · Follow your doctor's instructions about activity. He or she may tell you not to do sports or activities that could injure your neck. · Stretch your neck and shoulders as your doctor or physical therapist recommends. If your doctor says it is okay to do them, these exercises may help:  ? Neck stretches to the side. Keep your shoulders relaxed and slowly tilt your head straight over toward one shoulder. Hold for 15 seconds. Let the weight of your head stretch your muscles. Then do the same toward the other shoulder. ? Neck rotations. Keep your chin level and slowly turn your head to one side. Hold for 15 seconds. Then do the same to the other side. ? Shoulder rolls. Roll your shoulders up, then back, and then down in a smooth, circular motion. Repeat several times. When should you call for help? Call 911 anytime you think you may need emergency care. For example, call if:    · You are unable to move an arm or a leg at all. Call your doctor now or seek immediate medical care if:    · You have new or worse symptoms in your arms, legs, belly, or buttocks. Symptoms may include:  ? Numbness or tingling. ? Weakness. ? Pain.     · You lose bladder or bowel control. Watch closely for changes in your health, and be sure to contact your doctor if:    · You do not get better as expected. Where can you learn more? Go to http://www.gray.com/  Enter M594 in the search box to learn more about \"Cervical Spinal Stenosis: Care Instructions. \"  Current as of: July 1, 2021               Content Version: 13.2  © 2006-2022 [employer]wise[employer]. Care instructions adapted under license by [employer] (which disclaims liability or warranty for this information). If you have questions about a medical condition or this instruction, always ask your healthcare professional. [place] [address] any warranty or liability for your use of this information.

## 2022-06-21 ENCOUNTER — OFFICE VISIT (OUTPATIENT)
Dept: ORTHOPEDIC SURGERY | Age: 57
End: 2022-06-21
Payer: COMMERCIAL

## 2022-06-21 VITALS
DIASTOLIC BLOOD PRESSURE: 80 MMHG | RESPIRATION RATE: 16 BRPM | OXYGEN SATURATION: 98 % | HEART RATE: 66 BPM | TEMPERATURE: 97.2 F | SYSTOLIC BLOOD PRESSURE: 150 MMHG | BODY MASS INDEX: 31.06 KG/M2 | WEIGHT: 168.8 LBS | HEIGHT: 62 IN

## 2022-06-21 DIAGNOSIS — M25.512 CHRONIC LEFT SHOULDER PAIN: ICD-10-CM

## 2022-06-21 DIAGNOSIS — G89.29 CHRONIC LEFT SHOULDER PAIN: ICD-10-CM

## 2022-06-21 DIAGNOSIS — G89.29 CHRONIC RIGHT SHOULDER PAIN: ICD-10-CM

## 2022-06-21 DIAGNOSIS — M25.511 CHRONIC RIGHT SHOULDER PAIN: Primary | ICD-10-CM

## 2022-06-21 DIAGNOSIS — M25.511 CHRONIC RIGHT SHOULDER PAIN: ICD-10-CM

## 2022-06-21 DIAGNOSIS — G89.29 CHRONIC RIGHT SHOULDER PAIN: Primary | ICD-10-CM

## 2022-06-21 PROCEDURE — 99214 OFFICE O/P EST MOD 30 MIN: CPT | Performed by: PHYSICAL MEDICINE & REHABILITATION

## 2022-06-21 NOTE — PROGRESS NOTES
[address]  [address]  Phone: [phone]  Fax: [phone]      Patient: Esther Best                                                                              MRN: [id number]        YOB: 1965          AGE: 65 y.o. PCP: KULDIP Pryor  Date:  06/21/22    Reason for Consultation: Follow-up      HPI:  Esther Best is a 65 y.o. female with relevant PMH of DM who presents with bilateral arm pain radiating down the arms into her fingers, with tingling in her right 1st and 2nd digit (feels like ice). The pain began 1.5 years ago worsened over the past year. Denies any precipitating incident or trauma. She is followed by Dr. Skylar Osuna for neck pain for cervical spinal stenosis and has tried various medications, is currently taking gabapentin 300mg bid. She is trying to determine if her pain is all related to cervical stenosis or if there is a component from her shoulders. She has severe restricted b/l shoulder ROM. Neurologic symptoms: No numbness, tingling, weakness, bowel or bladder changes. No recent falls      Location: The pain is located in the bilateral shoulders   Radiation: The pain does radiate bilateral arms. Pain Score: Currently: 7/10  At Best: 5/10  At Worst: 10/10   Quality: Pain is of a Achy, Burning, Cramping, Stiff, Tingling, Numbness and Tight quality. Aggravating: Pain is exacerbated by worse at night  Alleviating: The pain is alleviated by nothing    Prior Treatments:  Physical therapy: NO  Injections:YES    Lumbar OSCAR     Previous Medications: lyrica, elavil, meloxicam  Current Medications: gabapentin 300mg tid   Previous work-up has included:   MRI cervical spine 1/18/22  1. Congenitally small caliber cervical spinal canal with multilevel degenerative  disc disease and uncovertebral arthritis.  -No high-grade spinal canal stenosis.  Multilevel mild spinal canal narrowing,  most pronounced at C3/C4 and C4/C5. Mass effect on the ventral cord at several  levels without appreciable cord edema. -Multilevel foraminal stenosis, predominantly secondary to uncovertebral  arthritis, most pronounced and moderate to severe at the left C5/C6 level.     2. Multilevel mild/moderate foraminal stenosis within the superior thoracic  spine, not closely evaluated. Past Medical History:   Past Medical History:   Diagnosis Date    Arthritis     Diabetes ([address]     GERD (gastroesophageal reflux disease)     Migraines       Past Surgical History:   Past Surgical History:   Procedure Laterality Date    HX TONSILLECTOMY      HX TUBAL LIGATION        SocHx:   Social History     Tobacco Use    Smoking status: Never Smoker    Smokeless tobacco: Never Used   Substance Use Topics    Alcohol use: No      FamHx:? Family History   Problem Relation Age of Onset   Liannelarie Lakia Arthritis-rheumatoid Mother     Heart Disease Mother     Hypertension Mother        Current Medications:    Current Outpatient Medications   Medication Sig Dispense Refill    gabapentin (NEURONTIN) 300 mg capsule Take 1 Capsule by mouth three (3) times daily. Max Daily Amount: 900 mg. 90 Capsule 2    Toujeo Max U-300 SoloStar 300 unit/mL (3 mL) inpn 38 Units by SubCUTAneous route daily.  azelastine (ASTELIN) 137 mcg (0.1 %) nasal spray 2 Sprays by Both Nostrils route daily as needed.  montelukast (Singulair) 10 mg tablet Take 10 mg by mouth daily as needed.  metFORMIN (GLUCOPHAGE) 500 mg tablet Take 1 Tablet by mouth two (2) times daily (with meals). 30 Tablet 0    Blood-Glucose Meter (OneTouch Verio Flex meter) misc 1 Each by Does Not Apply route Every morning.  1 Each 0    glucose blood VI test strips (OneTouch Verio test strips) strip Use with each glucose check daily before breakfast 100 Strip 0    lancets (Microlet Lancet) misc Use with each glucose check daily before breakfast 10 Each 0    amitriptyline (ELAVIL) 25 mg tablet Take 1 Tablet by mouth nightly. (Patient not taking: Reported on 6/21/2022) 30 Tablet 2    meloxicam (MOBIC) 15 mg tablet Take 1 Tablet by mouth daily as needed for Pain. (Patient not taking: Reported on 6/21/2022) 30 Tablet 2      Allergies: Allergies   Allergen Reactions    Pcn [Penicillins] Anaphylaxis    Codeine Nausea and Vomiting    Duragesic [Fentanyl] Other (comments)     dizziness    Lyrica [Pregabalin] Nausea and Vomiting        Review of Systems:   Gen:    Denied fevers, chills, malaise, fatigue, weight changes   Resp: Denied shortness of breath, cough, wheezing   CVS: Denied chest pain, palpitations   : Denied urinary urgency, frequency, incontinence   GI: Denied nausea, vomiting, constipation, diarrhea   Skin: Denied rashes, wounds   Psych: Denied anxiety, depression   Vasc: Denied claudication, ulcers   Hem: Denied easy bruising/bleeding   MSK: See HPI   Neuro: See HPI         Physical Exam     Vital Signs:   Visit Vitals  BP (!) 150/80   Pulse 66   Temp 97.2 °F (36.2 °C) (Temporal)   Resp 16   Ht 5' 2\" (1.575 m)   Wt 168 lb 12.8 oz (76.6 kg)   SpO2 98%   BMI 30.87 kg/m²      General: ??????? Well nourished and well developed female without any acute distress   Psychiatric: ?  Alert and oriented x 3 with normal mood    HEENT: ???????? Atraumatic   Respiratory:   Breathing non-labored and non dyspneic   CV: ???????????????? Peripheral pulses intact, no peripheral edema   Skin: ????????????? No rashes       Neurologic: ?? Sensation: normal and grossly intact thebilateral, upper extremity(s) except reduced right hand C6   Strength: 5/5 in the bilateral, upper extremity(s) except give way weakness limited by pain b/l shoulders  Reflexes: reveals 2+ symmetric DTRs throughout UE  Gait: normal and tandem gait   Upper tract signs: Babinski down going, Veloz's negative ?      Musculoskeletal: Cervical Exam/Shoulder   Alignment: Normal  Atrophy: None     Tenderness to Palpation:   Cervical paraspinals: Positive  Cervical spinous processes: Negative  Upper thoracic paraspinals: Positive  Upper thoracic spinous processes: Negative  Upper trapezius:  Positive    Cervical ROM: Abnormal pain with cervical motion worse with extension   Shoulder ROM: Restricted shoulder ROM left shoulder to 100 degrees, right shoulder 90 degrees, ER 40 degrees at side  Limited IR    Special Tests    Spurlings Maneuver: Positive pain down right arm   Hoffmans: Negative  Hawkin's Test: Positive b/l  Neer's Test: Positive b/l  Empty Can Test: Positive b/l           Medical Decision Making:    Images: The imaging results as well as the actual images of the studies below were reviewed, visualized and interpreted by me. Labs: The results below were reviewed. Lab Results   Component Value Date/Time    Hemoglobin A1c 12.1 (H) 11/02/2021 04:30 PM     Limited ultrasound   Left shoulder- biceps tendinopathy with tendon thickening and focal tenosynovitis, supraspinatus thickening tendinopathy without tear, no subacromial bursitis, intact infraspinatus, tendinopathy of subscapularis  Right shoulder- biceps tendinopathy, supraspinatus and subascapularis tendinopathy without tear  MRI cervical spine - diffuse degenerative changes- spinal stenosis, with multilevel foraminal stenosis        Assessment:  B/l shoulder tendinopathy  ? adhesive capsulitis given DM- reports more recent A1C is 8  Cervical spondylosis and stenosis    Plan:      -Physical therapy -  Referral to PT for shoulder ROM, adhesive capsulitis   -Medications - continue current medications  Counseled regarding side effects and appropriate administration of medications.    -Diagnostics/Imaging - x-ray bilateral shoulders  Labs- will check ESR, CRP, RF, JOSE, vit D- reports family history of RA  -Injections -consider GH shoulder injection -given DM would try one shoulder and follow up with other shoulder 2-3 weeks later  -Education - The patient's diagnosis, prognosis and treatment options were discussed today. All questions were answered. F/U - in 4 week(s) or sooner if needed.   Consider shoulder injection         [facility]

## 2022-06-23 ENCOUNTER — HOSPITAL ENCOUNTER (OUTPATIENT)
Dept: LAB | Age: 57
Discharge: HOME | End: 2022-06-23
Payer: COMMERCIAL

## 2022-06-23 ENCOUNTER — HOSPITAL ENCOUNTER (OUTPATIENT)
Dept: GENERAL RADIOLOGY | Age: 57
Discharge: HOME | End: 2022-06-23
Payer: COMMERCIAL

## 2022-06-23 DIAGNOSIS — M25.512 CHRONIC LEFT SHOULDER PAIN: ICD-10-CM

## 2022-06-23 DIAGNOSIS — M25.511 CHRONIC RIGHT SHOULDER PAIN: ICD-10-CM

## 2022-06-23 DIAGNOSIS — G89.29 CHRONIC RIGHT SHOULDER PAIN: ICD-10-CM

## 2022-06-23 DIAGNOSIS — G89.29 CHRONIC LEFT SHOULDER PAIN: ICD-10-CM

## 2022-06-23 LAB
25(OH)D3 SERPL-MCNC: 9.7 NG/ML (ref 30–100)
CRP SERPL-MCNC: <0.3 MG/DL (ref 0–0.3)
ERYTHROCYTE [SEDIMENTATION RATE] IN BLOOD: 17 MM/HR (ref 0–30)
RHEUMATOID FACT SERPL-ACNC: <10 IU/ML

## 2022-06-23 PROCEDURE — 36415 COLL VENOUS BLD VENIPUNCTURE: CPT

## 2022-06-23 PROCEDURE — 86431 RHEUMATOID FACTOR QUANT: CPT

## 2022-06-23 PROCEDURE — 86140 C-REACTIVE PROTEIN: CPT

## 2022-06-23 PROCEDURE — 82306 VITAMIN D 25 HYDROXY: CPT

## 2022-06-23 PROCEDURE — 73030 X-RAY EXAM OF SHOULDER: CPT

## 2022-06-23 PROCEDURE — 86038 ANTINUCLEAR ANTIBODIES: CPT

## 2022-06-23 PROCEDURE — 85652 RBC SED RATE AUTOMATED: CPT

## 2022-06-30 LAB — ANA TITR SER IF: NEGATIVE {TITER}

## 2022-07-19 ENCOUNTER — CLINICAL SUPPORT (OUTPATIENT)
Dept: ORTHOPEDIC SURGERY | Age: 57
End: 2022-07-19
Payer: COMMERCIAL

## 2022-07-19 VITALS
WEIGHT: 168 LBS | HEIGHT: 62 IN | TEMPERATURE: 97.3 F | BODY MASS INDEX: 30.91 KG/M2 | RESPIRATION RATE: 18 BRPM | OXYGEN SATURATION: 99 % | HEART RATE: 88 BPM

## 2022-07-19 DIAGNOSIS — G89.29 CHRONIC RIGHT SHOULDER PAIN: ICD-10-CM

## 2022-07-19 DIAGNOSIS — G89.29 CHRONIC LEFT SHOULDER PAIN: Primary | ICD-10-CM

## 2022-07-19 DIAGNOSIS — M25.512 CHRONIC LEFT SHOULDER PAIN: Primary | ICD-10-CM

## 2022-07-19 DIAGNOSIS — M25.511 CHRONIC RIGHT SHOULDER PAIN: ICD-10-CM

## 2022-07-19 PROCEDURE — 99024 POSTOP FOLLOW-UP VISIT: CPT | Performed by: PHYSICAL MEDICINE & REHABILITATION

## 2022-07-19 PROCEDURE — 20611 DRAIN/INJ JOINT/BURSA W/US: CPT | Performed by: PHYSICAL MEDICINE & REHABILITATION

## 2022-07-19 RX ORDER — LIDOCAINE HYDROCHLORIDE 20 MG/ML
3 INJECTION, SOLUTION INFILTRATION; PERINEURAL ONCE
Status: COMPLETED | OUTPATIENT
Start: 2022-07-19 | End: 2022-07-19

## 2022-07-19 RX ORDER — ERGOCALCIFEROL 1.25 MG/1
50000 CAPSULE ORAL
Qty: 8 CAPSULE | Refills: 0 | Status: SHIPPED | OUTPATIENT
Start: 2022-07-19 | End: 2022-08-04 | Stop reason: SDUPTHER

## 2022-07-19 RX ORDER — TRIAMCINOLONE ACETONIDE 40 MG/ML
40 INJECTION, SUSPENSION INTRA-ARTICULAR; INTRAMUSCULAR ONCE
Status: COMPLETED | OUTPATIENT
Start: 2022-07-19 | End: 2022-07-19

## 2022-07-19 RX ORDER — LANCETS 33 GAUGE
EACH MISCELLANEOUS
COMMUNITY
Start: 2022-05-13

## 2022-07-19 RX ORDER — METFORMIN HYDROCHLORIDE 500 MG/1
TABLET, EXTENDED RELEASE ORAL
COMMUNITY
Start: 2022-07-06

## 2022-07-19 RX ADMIN — TRIAMCINOLONE ACETONIDE 40 MG: 40 INJECTION, SUSPENSION INTRA-ARTICULAR; INTRAMUSCULAR at 16:19

## 2022-07-19 RX ADMIN — LIDOCAINE HYDROCHLORIDE 60 MG: 20 INJECTION, SOLUTION INFILTRATION; PERINEURAL at 16:18

## 2022-07-19 NOTE — PROGRESS NOTES
[address]  [address], [address]  [address]  Phone: [phone]  Fax: [phone]      Patient: Bernard Shelby                                                                              MRN: [id number]        YOB: 1965          AGE: 65 y.o. PCP: KULDIP Lai  Date:  07/19/22    Reason for Consultation: Arm Pain (left and right)      HPI:  Bernard Shelby is a 65 y.o. female with relevant PMH of DM who presented with bilateral arm pain radiating down the arms into her fingers, with tingling in her right 1st and 2nd digit (feels like ice). The pain began 1.5 years ago worsened over the past year. Denies any precipitating incident or trauma. She is followed by Dr. Amanda Castillo for neck pain for cervical spinal stenosis and has tried various medications, is currently taking gabapentin 300mg bid. She is trying to determine if her pain is all related to cervical stenosis or if there is a component from her shoulders. She has severe restricted b/l shoulder ROM. X-ray demonstrate preserved glenohumeral joint space with bilateral down sloping acromion. We also checked lab work and she has vit d level 5. Neurologic symptoms: No numbness, tingling, weakness, bowel or bladder changes. No recent falls      Location: The pain is located in the bilateral shoulders   Radiation: The pain does radiate bilateral arms. Pain Score: Currently: 7/10  At Best: 5/10  At Worst: 10/10   Quality: Pain is of a Achy, Burning, Cramping, Stiff, Tingling, Numbness and Tight quality. Aggravating: Pain is exacerbated by worse at night  Alleviating: The pain is alleviated by nothing    Prior Treatments:  Physical therapy: NO  Injections:YES    Lumbar OSCAR     Previous Medications: lyrica, elavil, meloxicam  Current Medications: gabapentin 300mg tid   Previous work-up has included:   MRI cervical spine 1/18/22  1.  Congenitally small caliber cervical spinal canal with multilevel degenerative  disc disease and uncovertebral arthritis.  -No high-grade spinal canal stenosis. Multilevel mild spinal canal narrowing,  most pronounced at C3/C4 and C4/C5. Mass effect on the ventral cord at several  levels without appreciable cord edema. -Multilevel foraminal stenosis, predominantly secondary to uncovertebral  arthritis, most pronounced and moderate to severe at the left C5/C6 level.     2. Multilevel mild/moderate foraminal stenosis within the superior thoracic  spine, not closely evaluated. X-ray b/l shoulder 6/23/22- preserved joint space b/l down sloping acromion    Past Medical History:   Past Medical History:   Diagnosis Date    Arthritis     Diabetes ([address]     GERD (gastroesophageal reflux disease)     Migraines       Past Surgical History:   Past Surgical History:   Procedure Laterality Date    HX TONSILLECTOMY      HX TUBAL LIGATION        SocHx:   Social History     Tobacco Use    Smoking status: Never Smoker    Smokeless tobacco: Never Used   Substance Use Topics    Alcohol use: No      FamHx:? Family History   Problem Relation Age of Onset   [place] Arthritis-rheumatoid Mother     Heart Disease Mother     Hypertension Mother        Current Medications:    Current Outpatient Medications   Medication Sig Dispense Refill    OneTouch Delica Plus Lancet 33 gauge misc       metFORMIN ER (GLUCOPHAGE XR) 500 mg tablet       gabapentin (NEURONTIN) 300 mg capsule Take 1 Capsule by mouth three (3) times daily. Max Daily Amount: 900 mg. 90 Capsule 2    Toujeo Max U-300 SoloStar 300 unit/mL (3 mL) inpn 38 Units by SubCUTAneous route daily.  azelastine (ASTELIN) 137 mcg (0.1 %) nasal spray 2 Sprays by Both Nostrils route daily as needed.  montelukast (Singulair) 10 mg tablet Take 10 mg by mouth daily as needed.  metFORMIN (GLUCOPHAGE) 500 mg tablet Take 1 Tablet by mouth two (2) times daily (with meals).  30 Tablet 0    Blood-Glucose Meter (OneTouch Verio Flex meter) misc 1 Each by Does Not Apply route Every morning. 1 Each 0    glucose blood VI test strips (OneTouch Verio test strips) strip Use with each glucose check daily before breakfast 100 Strip 0    lancets (Microlet Lancet) misc Use with each glucose check daily before breakfast 10 Each 0      Allergies: Allergies   Allergen Reactions    Pcn [Penicillins] Anaphylaxis    Codeine Nausea and Vomiting    Duragesic [Fentanyl] Other (comments)     dizziness    Lyrica [Pregabalin] Nausea and Vomiting        Review of Systems:   Gen:    Denied fevers, chills, malaise, fatigue, weight changes   Resp: Denied shortness of breath, cough, wheezing   CVS: Denied chest pain, palpitations   : Denied urinary urgency, frequency, incontinence   GI: Denied nausea, vomiting, constipation, diarrhea   Skin: Denied rashes, wounds   Psych: Denied anxiety, depression   Vasc: Denied claudication, ulcers   Hem: Denied easy bruising/bleeding   MSK: See HPI   Neuro: See HPI         Physical Exam     Vital Signs:   Visit Vitals  Pulse 88   Temp 97.3 °F (36.3 °C) (Temporal)   Resp 18   Ht 5' 2\" (1.575 m)   Wt 168 lb (76.2 kg)   SpO2 99% Comment: RA   BMI 30.73 kg/m²      General: ??????? Well nourished and well developed female without any acute distress   Psychiatric: ?  Alert and oriented x 3 with normal mood    HEENT: ???????? Atraumatic   Respiratory:   Breathing non-labored and non dyspneic   CV: ???????????????? Peripheral pulses intact, no peripheral edema   Skin: ????????????? No rashes       Neurologic: ?? Sensation: normal and grossly intact thebilateral, upper extremity(s) except reduced right hand C6   Strength: 5/5 in the bilateral, upper extremity(s) except give way weakness limited by pain b/l shoulders  Reflexes: reveals 2+ symmetric DTRs throughout UE  Gait: normal and tandem gait   Upper tract signs: Babinski down going, Veloz's negative ?      Musculoskeletal: Cervical Exam/Shoulder   Alignment: Normal  Atrophy: None     Tenderness to Palpation:   Cervical paraspinals: Positive  Cervical spinous processes: Negative  Upper thoracic paraspinals: Positive  Upper thoracic spinous processes: Negative  Upper trapezius:  Positive    Cervical ROM: Abnormal pain with cervical motion worse with extension   Shoulder ROM: Restricted shoulder ROM left shoulder to 100 degrees, right shoulder 90 degrees, ER 40 degrees at side  Limited IR    Special Tests    Spurlings Maneuver: Positive pain down right arm   Hoffmans: Negative  Hawkin's Test: Positive b/l  Neer's Test: Positive b/l  Empty Can Test: Positive b/l           Medical Decision Making:    Images: The imaging results as well as the actual images of the studies below were reviewed, visualized and interpreted by me. Labs: The results below were reviewed. Lab Results   Component Value Date/Time    Hemoglobin A1c 12.1 (H) 11/02/2021 04:30 PM     Limited ultrasound   Left shoulder- biceps tendinopathy with tendon thickening and focal tenosynovitis, supraspinatus thickening tendinopathy without tear, no subacromial bursitis, intact infraspinatus, tendinopathy of subscapularis  Right shoulder- biceps tendinopathy, supraspinatus and subascapularis tendinopathy without tear  MRI cervical spine - diffuse degenerative changes- spinal stenosis, with multilevel foraminal stenosis  X-ray b/l shoulder 6/23/22      Assessment:  B/l shoulder tendinopathy/subacromial bursitis  ? adhesive capsulitis given DM- reports more recent A1C is 8  Cervical spondylosis and stenosis  Vitamin D deficiency   Plan:      -Physical therapy -  Referral to PT for shoulder ROM, adhesive capsulitis   -Medications - continue current medications   -vit D 50,000 units weekly x 8 weeks   Counseled regarding side effects and appropriate administration of medications.    -Diagnostics/Imaging - x-ray bilateral shoulders-reviewed    -Injection b/l subacromial injections see procedure note below    -Education - The patient's diagnosis, prognosis and treatment options were discussed today. All questions were answered.  -will let PCP know about vit D deficiency     F/U - 2 weeks         [address] [facility]        [facility]  [address], [address]  [facility], [address]  Phone: [phone]  Fax: [phone]      Encounter Date: 7/19/2022          Diagnosis:     ICD-10-CM ICD-9-CM    1. Chronic left shoulder pain  M25.512 719.41 ergocalciferol (ERGOCALCIFEROL) 1,250 mcg (50,000 unit) capsule    G89.29 338.29 ARTHROCENTESIS ASPIR&/INJ MAJOR JT/BURSA W/US      lidocaine (XYLOCAINE) 20 mg/mL (2 %) injection 60 mg      triamcinolone acetonide (KENALOG-40) 40 mg/mL injection 40 mg   2. Chronic right shoulder pain  M25.511 719.41 ergocalciferol (ERGOCALCIFEROL) 1,250 mcg (50,000 unit) capsule    G89.29 338.29 ARTHROCENTESIS ASPIR&/INJ MAJOR JT/BURSA W/US      lidocaine (XYLOCAINE) 20 mg/mL (2 %) injection 60 mg      triamcinolone acetonide (KENALOG-40) 40 mg/mL injection 40 mg                Indication:b/l shoulder pain         Procedure: Sonographically guided bilateral subacromial injection         Informed Consent: Following denial of allergy and review of potential side effects and complications including, but not limited to, infection, allergic reaction, local tissue breakdown, injury to soft tissue and/or nerves and seizure, the patient indicated understanding and agreed to proceed. Procedural pause conducted to verify: correct patient identity, procedure to be performed and, as applicable, correct side and site, correct patient position, availability of any special equipment or other special requirements. Justification for use of ultrasound guidance:  The use of direct sonographic visualization (rather than a non-guided injection) was required to ensure accurate injection placement for diagnostic specificity, to maximize clinical efficacy, and for safety purposes to minimize risk of bleeding or injury to nearby structures. Technique: The procedure was carried out under sterile technique utilizing a sterile ultrasound transducer cover and sterile ultrasound gel. Pre-procedural scanning was performed to determine optimal approach for the procedure. The patient was prepped and draped in the usual sterile fashion. Patient position:seated     Approach: lateral to medial     Needle: 25 gauge 1.5 inch     Details: Live sonographic guidance with a12  MHz transducer was used throughout the procedure. A 25 gauge 1.5 inch needle with 3mL 2% lidocaine and 0.5ml 20mg  mL of kenalog was injected into the left shoulder. A 25 gauge 1.5 inch needle with 3mL 2% lidocaine and 0.5ml 20mg  mL of kenalog was injected into the right shoulder. Post-Procedure Instructions: The patient tolerated the procedure well without complication and was discharged in good condition after a short observation period. The patient was instructed to avoid submerging the procedure site in water for 48-72 hours. The patient was instructed to contact me with any questions pertaining to the procedure          Key images were saved.           Impression: Technically successful sonographically guided left and right subacromial injection         Danitza Shea MD

## 2022-07-19 NOTE — LETTER
NAME: Iris Jerez  : 1965  MRN: [id number]    PROCEDURAL INFORMED CONSENT FOR OPERATION / PROCEDURE     1. I (we),      Iris Jerez      authorize    Becky Bourne MD       and/or such assistants as may be selected by him/her, to perform the following operation/procedures    Bilateral subacromial  injection ultrasound guided      Note: If unable to obtain consent prior to an emergent procedure, document the emergent reason in the medical record. This procedure has been explained to my (our) satisfaction and included in the explanation was:   A) the intended benefit, nature, and extent of the procedure to be performed;  B) the significant risks involved and the probability of success;  C) alternative procedures and methods of treatment;  D) the dangers and probable consequences of such alternatives (including no procedure or treatment); E) the expected consequences of the procedure on my future health;  F) whether other qualified individuals would be performing important surgical tasks and / or whether  would be present to advise or support the procedure. I (we) understand that there are other risks of infection and other serious complications in the pre-operative/procedural and postoperative/procedural stages of my (our) care. I (we) have asked all of the questions which I (we) thought were important in deciding whether or not to undergo treatment or diagnosis. These questions have been answered to my (our) satisfaction. I (we) understand that no assurance can be given that the procedure will be a success, and no guarantee or warranty of success has been given to me (us). 2.  It has been explained to me (us) that during the course of the operation/procedure, unforeseen conditions may be revealed that necessitate extension of the original procedure(s) or different procedure(s) than those set forth in Paragraph 1.  I (we) authorize and request that the above-named physician, his/her assistants or his/her designees, perform procedures as necessary and desirable if deemed to be in my (our) best interest.    3.  I acknowledge that other health care personnel may be observing this procedure for the purpose of medical education or other specified purposes as may be necessary as requested and/or approved by my (our) physician. 4.  I (we) consent to the disposal by the hospital Pathologist of the removed tissue, parts or organs in accordance with hospital policy. Page 1 of 2    NAME: Inderjit Nagel  : 1965  MRN: [id number]    6. I do_____ do not______ consent to the use of a local infiltration pain blocking agent that will be used by my provider/surgical provider to help alleviate pain during my procedure. 6.  I do_____ do not_____ consent to an emergent blood transfusion in the case of a life-threatening situation that requires blood components to be administered. This consent is valid for 24 hours from the beginning of the procedure. 7.  This patient does _____ or does not ______currently have a DNR status/order. If DNR order is in place, obtain Addendum to the Surgical Consent for ALL Patients with a DNR Order to address sparkle-operative status for limited intervention or DNR suspension. 8.  I have read and fully understand the above Consent for Operation/Procedure and that all blanks were completed before I signed the consent.       Inderjit Nagel     Signature of Patient (or legal representative)  Printed Name / Relationship                2022 /         AM / PM   Witness to Signature  Printed Name   Date/Time        (If patient is unable to sign or is a minor, complete the following)               Patient is a minor, ____years of age, or unable to sign because: _____________________          ________________________________________________________________________  Blase Liming If a phone consent is obtained, consent will be documented by using two health care professionals, each affirming that the consenting party   has no questions and gives consent for the procedure discussed with the physician/provider. 7/19/2022 /               AM / PM   2nd Witness to phone consent  Printed Name   Date/Time     Informed Consent:  I have provided the explanation described above in section 1 to the patient and/or legal representative. I have provided the patient and/or legal representative with an opportunity to ask any questions about the proposed operation/procedure. Luh Altamirano MD    7/19/2022   /            AM / PM   Provider / Proceduralist  Printed Name  Date/Time     This Provider / Jacob Sanchez performing the surgery is ONLY for Office-based procedures in [place]   [ x] Board certified or Board eligible by one of the [employer] [place], the tribrs of [facility] of the [employer], the [employer] of Podiatric Medicine, the [employer] of Foot and Ankle Surgery, or other board as approved by the [facility] for medical staff appointment.             Page 2 of 2  Revised 8/2/2021

## 2022-07-19 NOTE — PROGRESS NOTES
Bon Betancourt presents today for   Chief Complaint   Patient presents with    Arm Pain     left and right       Is someone accompanying this pt? no    Is the patient using any DME equipment during OV? no    Depression Screening:  3 most recent PHQ Screens 6/21/2022   Little interest or pleasure in doing things Not at all   Feeling down, depressed, irritable, or hopeless Not at all   Total Score PHQ 2 0       Learning Assessment:  Learning Assessment 2/24/2022   PRIMARY LEARNER Patient   PRIMARY LANGUAGE ENGLISH   LEARNER PREFERENCE PRIMARY LISTENING   ANSWERED BY patient   RELATIONSHIP SELF       Abuse Screening:  No flowsheet data found. Fall Risk  No flowsheet data found. OPIOID RISK TOOL  No flowsheet data found. Coordination of Care:  1. Have you been to the ER, urgent care clinic since your last visit? no  Hospitalized since your last visit? no    2. Have you seen or consulted any other health care providers outside of the [address] [place] [address] since your last visit? no Include any pap smears or colon screening.  Not yet

## 2022-07-19 NOTE — LETTER
7/19/2022 4:21 PM    Ms. New Gage  [address] [place] [address],  [address] [id number]              Sincerely,      Angela Brown MD

## 2022-08-04 ENCOUNTER — VIRTUAL VISIT (OUTPATIENT)
Dept: ORTHOPEDIC SURGERY | Age: 57
End: 2022-08-04
Payer: COMMERCIAL

## 2022-08-04 DIAGNOSIS — M25.512 CHRONIC LEFT SHOULDER PAIN: ICD-10-CM

## 2022-08-04 DIAGNOSIS — M25.511 CHRONIC RIGHT SHOULDER PAIN: ICD-10-CM

## 2022-08-04 DIAGNOSIS — G89.29 CHRONIC RIGHT SHOULDER PAIN: ICD-10-CM

## 2022-08-04 DIAGNOSIS — G89.29 CHRONIC LEFT SHOULDER PAIN: ICD-10-CM

## 2022-08-04 PROCEDURE — 99442 PR PHYS/QHP TELEPHONE EVALUATION 11-20 MIN: CPT | Performed by: PHYSICAL MEDICINE & REHABILITATION

## 2022-08-04 RX ORDER — ERGOCALCIFEROL 1.25 MG/1
50000 CAPSULE ORAL
Qty: 8 CAPSULE | Refills: 0 | Status: SHIPPED | OUTPATIENT
Start: 2022-08-04 | End: 2022-09-23

## 2022-08-04 NOTE — PROGRESS NOTES
marito    [facility]  [address], [address]  [facility], [address]  Phone: [phone]  Fax: [phone]      Patient: Ashley Concepcion                                                                              MRN: [id number]        YOB: 1965          AGE: 65 y.o. PCP: KULDIP Alvarado  Date:  08/04/22    Reason for Consultation: Shoulder Pain      HPI:  Ashley Concepcion is a 65 y.o. female with relevant PMH of DM who presented with bilateral arm pain radiating down the arms into her fingers, with tingling in her right 1st and 2nd digit (feels like ice). The pain began 1.5 years ago worsened over the past year. Denies any precipitating incident or trauma. She is followed by Dr. Adeel Sandoval for neck pain for cervical spinal stenosis and has tried various medications, is currently taking gabapentin 300mg bid. She is trying to determine if her pain is all related to cervical stenosis or if there is a component from her shoulders. She has severely restricted b/l shoulder ROM. X-ray demonstrate preserved glenohumeral joint space with bilateral down sloping acromion. We also checked lab work and she has vit d level 5. She has started replacement treatment. We tried bilateral subacromial injections which provided 40% relief       Neurologic symptoms: No numbness, tingling, weakness, bowel or bladder changes. No recent falls      Location: The pain is located in the bilateral shoulders   Radiation: The pain does radiate bilateral arms. Pain Score: Currently: 5/10  At Best: 5/10  At Worst: 10/10   Quality: Pain is of a Achy, Burning, Cramping, Stiff, Tingling, Numbness and Tight quality. Aggravating: Pain is exacerbated by  worse at night  Alleviating:  The pain is alleviated by nothing    Prior Treatments:  Physical therapy: NO  Injections:YES  Lumbar OSCAR   7/19/22- bilateral subacromial injection 40%    Previous Medications: lyrica, elavil, meloxicam  Current Medications: gabapentin 300mg tid   Previous work-up has included:   MRI cervical spine 1/18/22  1. Congenitally small caliber cervical spinal canal with multilevel degenerative disc disease and uncovertebral arthritis.  -No high-grade spinal canal stenosis. Multilevel mild spinal canal narrowing, most pronounced at C3/C4 and C4/C5. Mass effect on the ventral cord at several levels without appreciable cord edema. -Multilevel foraminal stenosis, predominantly secondary to uncovertebral  arthritis, most pronounced and moderate to severe at the left C5/C6 level. 2. Multilevel mild/moderate foraminal stenosis within the superior thoracic spine, not closely evaluated. X-ray b/l shoulder 6/23/22- preserved joint space b/l down sloping acromion    Past Medical History:   Past Medical History:   Diagnosis Date    Arthritis     Diabetes ([address]     GERD (gastroesophageal reflux disease)     Migraines       Past Surgical History:   Past Surgical History:   Procedure Laterality Date    HX TONSILLECTOMY      HX TUBAL LIGATION        SocHx:   Social History     Tobacco Use    Smoking status: Never    Smokeless tobacco: Never   Substance Use Topics    Alcohol use: No      FamHx:? Family History   Problem Relation Age of Onset    Arthritis-rheumatoid Mother     Heart Disease Mother     Hypertension Mother        Current Medications:    Current Outpatient Medications   Medication Sig Dispense Refill    ergocalciferol (ERGOCALCIFEROL) 1,250 mcg (50,000 unit) capsule Take 1 Capsule by mouth every seven (7) days for 8 doses. 8 Capsule 0    OneTouch Delica Plus Lancet 33 gauge misc       metFORMIN ER (GLUCOPHAGE XR) 500 mg tablet       gabapentin (NEURONTIN) 300 mg capsule Take 1 Capsule by mouth three (3) times daily. Max Daily Amount: 900 mg. 90 Capsule 2    Toujeo Max U-300 SoloStar 300 unit/mL (3 mL) inpn 38 Units by SubCUTAneous route daily.       azelastine (ASTELIN) 137 mcg (0.1 %) nasal spray 2 Sprays by Both Nostrils route daily as needed. montelukast (Singulair) 10 mg tablet Take 10 mg by mouth daily as needed. metFORMIN (GLUCOPHAGE) 500 mg tablet Take 1 Tablet by mouth two (2) times daily (with meals). 30 Tablet 0    Blood-Glucose Meter (OneTouch Verio Flex meter) misc 1 Each by Does Not Apply route Every morning. 1 Each 0    glucose blood VI test strips (OneTouch Verio test strips) strip Use with each glucose check daily before breakfast 100 Strip 0    lancets (Microlet Lancet) misc Use with each glucose check daily before breakfast 10 Each 0      Allergies: Allergies   Allergen Reactions    Pcn [Penicillins] Anaphylaxis    Codeine Nausea and Vomiting    Duragesic [Fentanyl] Other (comments)     dizziness    Lyrica [Pregabalin] Nausea and Vomiting          Medical Decision Making:    Images: The imaging results as well as the actual images of the studies below were reviewed, visualized and interpreted by me. Labs: The results below were reviewed. Lab Results   Component Value Date/Time    Hemoglobin A1c 12.1 (H) 11/02/2021 04:30 PM   Vitamin D 9.7      Assessment:    B/l shoulder tendinopathy/subacromial bursitis  ? adhesive capsulitis given DM- reports more recent A1C is 8  Cervical spondylosis and stenosis  Vitamin D deficiency     Plan:      -Physical therapy- Discussed PT- cost prohibitive   -Medications - continue current medications   -vit D 50,000 units weekly x 8 weeks -renewed rx because she could only get 4 capsules     -Diagnostics/Imaging - x-ray bilateral shoulders-reviewed    -Education - The patient's diagnosis, prognosis and treatment options were discussed today. All questions were answered.    -will let PCP know about vit D deficiency     F/U - 8 weeks         [address] [facility]      I was in the office while conducting this encounter.  Patient at home     Consent:  She and/or her healthcare decision maker is aware that this patient-initiated Telehealth encounter is a billable service, with coverage as determined by her insurance carrier. She is aware that she may receive a bill and has provided verbal consent to proceed: Yes    This virtual visit was conducted telephone encounter only. -  I affirm this is a Patient Initiated Episode with an Established Patient who has not had a related appointment within my department in the past 7 days or scheduled within the next 24 hours. Note: this encounter is not billable if this call serves to triage the patient into an appointment for the relevant concern. Total Time: minutes: 11-20 minutes.

## 2022-11-10 ENCOUNTER — TELEPHONE (OUTPATIENT)
Dept: ORTHOPEDIC SURGERY | Age: 57
End: 2022-11-10

## 2022-11-10 NOTE — TELEPHONE ENCOUNTER
Lvm for the patient regarding her message she had sent about making a appt with Neli Lyles.  I let the patient know to call the office to schedule an appt

## 2025-03-08 NOTE — PROGRESS NOTES
[facility]    [facility], [address]  [place] [address]  Phone: [phone]  Fax: [phone]        Kelsy Sanchez  : 1965  PCP: Dom Mcdaniel PA    PROGRESS NOTE      ASSESSMENT AND PLAN    Kelsy was seen today for pain and neck pain.    Diagnoses and all orders for this visit:    Right lumbar radiculitis  -     gabapentin (NEURONTIN) 300 MG capsule; Take 1 capsule by mouth 3 times daily for 180 days. Max Daily Amount: 900 mg  -     meloxicam (MOBIC) 15 MG tablet; Take 1 tablet by mouth daily as needed for Pain    Degeneration of intervertebral disc of lumbosacral region with discogenic back pain and lower extremity pain    Diabetic polyneuropathy associated with type 2 diabetes mellitus (HCC)  -     lidocaine (LIDODERM) 5 %; Place 2 patches onto the skin daily 12 hours on, 12 hours off.    Cervical spinal stenosis    Carpal tunnel syndrome of right wrist  -     gabapentin (NEURONTIN) 300 MG capsule; Take 1 capsule by mouth 3 times daily for 180 days. Max Daily Amount: 900 mg    Uncontrolled hypertension        Kelsy Sanchez is a 59 y.o. female with known cervical stenosis, no progression of myelopathy.  Symptoms suggestive of right CTS.  Recommend OTC night splint.   Refilled gabapentin, meloxicam as needed, and Lidoderm patches.  Asymptomatic hypertension in the office today.  She is going to take her antihypertensives, recheck blood pressure readings at home, follow-up with her PCP if persistent elevation.  If she has any chest pain, shortness of breath, headache, blurred vision along with hypertension, she needs to go to the ED.    Follow-up and Dispositions    Return in about 6 weeks (around 2025) for med adjustment/re-eval, spine re-eval.       HISTORY OF PRESENT ILLNESS    Kelsy Sanchez is a 59 y.o. female presents today for LBP radiating down RLE. At her last OV (2022), she was seen for CSS. Trial of Elavil 25mg QHS. Referred to

## 2025-03-13 ENCOUNTER — OFFICE VISIT (OUTPATIENT)
Age: 60
End: 2025-03-13

## 2025-03-13 VITALS
HEART RATE: 88 BPM | BODY MASS INDEX: 31.5 KG/M2 | HEIGHT: 62 IN | RESPIRATION RATE: 18 BRPM | TEMPERATURE: 98.5 F | OXYGEN SATURATION: 97 % | WEIGHT: 171.2 LBS

## 2025-03-13 DIAGNOSIS — I10 UNCONTROLLED HYPERTENSION: ICD-10-CM

## 2025-03-13 DIAGNOSIS — G56.01 CARPAL TUNNEL SYNDROME OF RIGHT WRIST: ICD-10-CM

## 2025-03-13 DIAGNOSIS — M51.372 DEGENERATION OF INTERVERTEBRAL DISC OF LUMBOSACRAL REGION WITH DISCOGENIC BACK PAIN AND LOWER EXTREMITY PAIN: ICD-10-CM

## 2025-03-13 DIAGNOSIS — M54.16 RIGHT LUMBAR RADICULITIS: Primary | ICD-10-CM

## 2025-03-13 DIAGNOSIS — M48.02 CERVICAL SPINAL STENOSIS: ICD-10-CM

## 2025-03-13 DIAGNOSIS — E11.42 DIABETIC POLYNEUROPATHY ASSOCIATED WITH TYPE 2 DIABETES MELLITUS (HCC): ICD-10-CM

## 2025-03-13 RX ORDER — GABAPENTIN 300 MG/1
300 CAPSULE ORAL 3 TIMES DAILY
Qty: 270 CAPSULE | Refills: 1 | Status: SHIPPED | OUTPATIENT
Start: 2025-03-13 | End: 2025-09-09

## 2025-03-13 RX ORDER — MELOXICAM 15 MG/1
15 TABLET ORAL DAILY PRN
Qty: 90 TABLET | Refills: 0 | Status: SHIPPED | OUTPATIENT
Start: 2025-03-13

## 2025-03-13 RX ORDER — LIDOCAINE 50 MG/G
2 PATCH TOPICAL DAILY
Qty: 180 PATCH | Refills: 1 | Status: SHIPPED | OUTPATIENT
Start: 2025-03-13 | End: 2025-09-09

## 2025-03-13 NOTE — PROGRESS NOTES
Kelsy Sanchez presents today for   Chief Complaint   Patient presents with    Pain    Neck Pain       Is someone accompanying this pt? no    Is the patient using any DME equipment during OV? no    Depression Screening:       No data to display                Learning Assessment:  Failed to redirect to the Timeline version of the [employer] SmartLink.    Abuse Screening:       No data to display                Fall Risk  Failed to redirect to the Timeline version of the [employer] SmartLink.    OPIOID RISK TOOL  Failed to redirect to the Timeline version of the [employer] SmartLink.    Coordination of Care:  1. Have you been to the ER, urgent care clinic since your last visit? no  Hospitalized since your last visit? no    2. Have you seen or consulted any other health care providers outside of the [facility] since your last visit? no Include any pap smears or colon screening. no

## 2025-05-10 DIAGNOSIS — M54.16 RIGHT LUMBAR RADICULITIS: ICD-10-CM

## 2025-05-12 RX ORDER — MELOXICAM 15 MG/1
15 TABLET ORAL DAILY PRN
Qty: 90 TABLET | Refills: 0 | OUTPATIENT
Start: 2025-05-12